# Patient Record
Sex: MALE | Race: WHITE | NOT HISPANIC OR LATINO | Employment: FULL TIME | URBAN - METROPOLITAN AREA
[De-identification: names, ages, dates, MRNs, and addresses within clinical notes are randomized per-mention and may not be internally consistent; named-entity substitution may affect disease eponyms.]

---

## 2019-04-18 ENCOUNTER — OFFICE VISIT (OUTPATIENT)
Dept: FAMILY MEDICINE | Facility: CLINIC | Age: 34
End: 2019-04-18
Payer: COMMERCIAL

## 2019-04-18 VITALS
HEIGHT: 69 IN | BODY MASS INDEX: 28.14 KG/M2 | WEIGHT: 190 LBS | DIASTOLIC BLOOD PRESSURE: 88 MMHG | OXYGEN SATURATION: 97 % | TEMPERATURE: 97.5 F | SYSTOLIC BLOOD PRESSURE: 128 MMHG | HEART RATE: 70 BPM

## 2019-04-18 DIAGNOSIS — F51.02 ADJUSTMENT INSOMNIA: ICD-10-CM

## 2019-04-18 DIAGNOSIS — L03.011 PARONYCHIA OF FINGER, RIGHT: Primary | ICD-10-CM

## 2019-04-18 PROCEDURE — 10060 I&D ABSCESS SIMPLE/SINGLE: CPT | Mod: F9 | Performed by: NURSE PRACTITIONER

## 2019-04-18 PROCEDURE — 99213 OFFICE O/P EST LOW 20 MIN: CPT | Mod: 25 | Performed by: NURSE PRACTITIONER

## 2019-04-18 RX ORDER — CEPHALEXIN 500 MG/1
500 CAPSULE ORAL 3 TIMES DAILY
Qty: 21 CAPSULE | Refills: 0 | Status: SHIPPED | OUTPATIENT
Start: 2019-04-18 | End: 2019-04-25

## 2019-04-18 RX ORDER — ZOLPIDEM TARTRATE 10 MG/1
5-10 TABLET ORAL
Qty: 30 TABLET | Refills: 0 | Status: SHIPPED | OUTPATIENT
Start: 2019-04-18 | End: 2021-03-16

## 2019-04-18 ASSESSMENT — MIFFLIN-ST. JEOR: SCORE: 1788.24

## 2019-04-18 NOTE — PATIENT INSTRUCTIONS
Soak finger in warm soapy water for 10 minutes at least 4 times a day. More if possible.   Use antibiotic ointment 2-3 times a day for no more than 1 week   If you notice more redness and swelling again start the antibiotic.   Call with any questions       Patient Education     Paronychia of the Finger or Toe  Paronychia is an infection near a fingernail or toenail. It usually occurs when an opening in the cuticle or an ingrown toenail lets bacteria under the skin.  The infection will need to be drained if pus is present. If the infection has been caught early, you may need only antibiotic treatment. Healing will take about 1 to 2 weeks.  Home care  Follow these guidelines when caring for yourself at home:    Clean and soak the toe or finger. Do this 2 times a day for the first 3 days. To do so:  ? Soak your foot or hand in a tub of warm water for 5 minutes. Or hold your toe or finger under a faucet of warm running water for 5 minutes.  ? Clean any crust away with soap and water using a cotton swab.  ? Put antibiotic ointment on the infected area.    Change the dressing daily or any time it gets dirty.    If you were given antibiotics, take them as directed until they are all gone.    If your infection is on a toe, wear comfortable shoes with a lot of toe room. You can also wear open-toed sandals while your toe heals.    You may use over-the-counter medicine (acetaminophen or ibuprofen to help with pain, unless another medicine was prescribed. If you have chronic liver or kidney disease, talk with your healthcare provider before using these medicines. Also talk with your provider if you've had a stomach ulcer or GI (gastrointestinal) bleeding.  Prevention  The following can prevent paronychia:    Avoid cutting or playing with your cuticles at home.    Don't bite your nails.    Don't suck on your thumbs or fingers.  Follow-up care  Follow up with your healthcare provider, or as advised.  When to seek medical  advice  Call your healthcare provider right away if any of these occur:    Redness, pain, or swelling of the finger or toe gets worse    Red streaks in the skin leading away from the wound    Pus or fluid draining from the nail area    Fever of 100.4 F (38 C) or higher, or as directed by your provider  Date Last Reviewed: 8/1/2016 2000-2018 [employer]. [address] [place] [address]. All rights reserved. This information is not intended as a substitute for professional medical care. Always follow your healthcare professional's instructions.

## 2019-04-18 NOTE — PROGRESS NOTES
"HPI    SUBJECTIVE:   Vazquez Rodriguez is a 34 year old male who presents to clinic today for the following   health issues:        Chief Complaint   Patient presents with     Finger     right 5th finger for 4 days     Right 5th finger swelling for about 4 days  No known injury   Started soaking last night which seems to help a little   No similar past     Also requesting refill of ambien. He travels a lot for work and takes it when he has international trips. Last script was 3 years ago so takes it infrequently.     Past Medical History:   Diagnosis Date     NO ACTIVE PROBLEMS      Family History   Problem Relation Age of Onset     Family History Negative Mother      Family History Negative Father      Family History Negative Paternal Grandfather      Past Surgical History:   Procedure Laterality Date     HC TOOTH EXTRACTION W/FORCEP  03/2004    [facility]is[facility] teeth     Social History     Tobacco Use     Smoking status: Never Smoker     Smokeless tobacco: Never Used   Substance Use Topics     Alcohol use: Yes     Comment: couple beers a month     Current Outpatient Medications   Medication Sig Dispense Refill     cephALEXin (KEFLEX) 500 MG capsule Take 1 capsule (500 mg) by mouth 3 times daily for 7 days 21 capsule 0     melatonin 3 MG tablet Take 1 tablet (3 mg) by mouth nightly as needed for sleep 20 tablet 1     zolpidem (AMBIEN) 10 MG tablet Take 0.5-1 tablets (5-10 mg) by mouth nightly as needed for sleep 30 tablet 0     Allergies   Allergen Reactions     No Known Drug Allergies        Reviewed and updated as needed this visit by clinical staff and provider     ROS  Detailed as above     /88 (BP Location: Right arm, Patient Position: Sitting, Cuff Size: Adult Regular)   Pulse 70   Temp 97.5  F (36.4  C) (Oral)   Ht 1.746 m (5' 8.75\")   Wt 86.2 kg (190 lb)   SpO2 97%   BMI 28.26 kg/m     Physical Exam   Constitutional: He appears well-developed.   Pulmonary/Chest: Effort normal.   Neurological: He is " alert.   Skin: Skin is warm and dry.   Swelling, erythema and large pustule adjacent to nail of R 5th finger   Psychiatric: He has a normal mood and affect. Judgment normal.       Assessment and Plan:       ICD-10-CM    1. Paronychia of finger, right L03.011 cephALEXin (KEFLEX) 500 MG capsule     DRAIN SKIN ABSCESS SIMPLE/SINGLE   2. Adjustment insomnia F51.02 zolpidem (AMBIEN) 10 MG tablet     Finger was cleansed with betadine. Single puncture was made with 20 gauge needle and large amt of purulent drainage was expelled.   Instructed on warm soaks and keeping area clean. Sent with antibiotic to start only if it worsens.. Call with concerns.   Given refill on ambien that he takes infrequently.       JOHANA Swift, CNP  [facility]

## 2020-02-28 ENCOUNTER — TRANSFERRED RECORDS (OUTPATIENT)
Dept: HEALTH INFORMATION MANAGEMENT | Facility: CLINIC | Age: 35
End: 2020-02-28

## 2020-02-28 LAB
CHOLEST SERPL-MCNC: 204 MG/DL (ref 125–199)
GLUCOSE SERPL-MCNC: 81 MG/DL (ref 70–99)
HDLC SERPL-MCNC: 38 MG/DL

## 2020-04-27 ENCOUNTER — VIRTUAL VISIT (OUTPATIENT)
Dept: FAMILY MEDICINE | Facility: CLINIC | Age: 35
End: 2020-04-27
Payer: COMMERCIAL

## 2020-04-27 VITALS — HEIGHT: 69 IN | WEIGHT: 185 LBS | BODY MASS INDEX: 27.4 KG/M2

## 2020-04-27 DIAGNOSIS — R30.0 DYSURIA: ICD-10-CM

## 2020-04-27 DIAGNOSIS — R30.0 DYSURIA: Primary | ICD-10-CM

## 2020-04-27 DIAGNOSIS — R31.29 MICROSCOPIC HEMATURIA: ICD-10-CM

## 2020-04-27 LAB
ALBUMIN UR-MCNC: NEGATIVE MG/DL
APPEARANCE UR: CLEAR
BACTERIA #/AREA URNS HPF: ABNORMAL /HPF
BILIRUB UR QL STRIP: NEGATIVE
COLOR UR AUTO: YELLOW
GLUCOSE UR STRIP-MCNC: NEGATIVE MG/DL
HGB UR QL STRIP: ABNORMAL
KETONES UR STRIP-MCNC: NEGATIVE MG/DL
LEUKOCYTE ESTERASE UR QL STRIP: NEGATIVE
NITRATE UR QL: NEGATIVE
NON-SQ EPI CELLS #/AREA URNS LPF: ABNORMAL /LPF
PH UR STRIP: 7 PH (ref 5–7)
RBC #/AREA URNS AUTO: ABNORMAL /HPF
SOURCE: ABNORMAL
SP GR UR STRIP: 1.02 (ref 1–1.03)
UROBILINOGEN UR STRIP-ACNC: 0.2 EU/DL (ref 0.2–1)
WBC #/AREA URNS AUTO: ABNORMAL /HPF

## 2020-04-27 PROCEDURE — 99213 OFFICE O/P EST LOW 20 MIN: CPT | Mod: TEL | Performed by: FAMILY MEDICINE

## 2020-04-27 PROCEDURE — 81001 URINALYSIS AUTO W/SCOPE: CPT | Performed by: FAMILY MEDICINE

## 2020-04-27 ASSESSMENT — MIFFLIN-ST. JEOR: SCORE: 1764.53

## 2020-04-27 NOTE — PROGRESS NOTES
"Vazquez Rodriguez is a 35 year old male who is being evaluated via a billable telephone visit.      The patient has been notified of following:     \"This telephone visit will be conducted via a call between you and your physician/provider. We have found that certain health care needs can be provided without the need for a physical exam.  This service lets us provide the care you need with a short phone conversation.  If a prescription is necessary we can send it directly to your pharmacy.  If lab work is needed we can place an order for that and you can then stop by our lab to have the test done at a later time.    Telephone visits are billed at different rates depending on your insurance coverage. During this emergency period, for some insurers they may be billed the same as an in-person visit.  Please reach out to your insurance provider with any questions.    If during the course of the call the physician/provider feels a telephone visit is not appropriate, you will not be charged for this service.\"    Patient has given verbal consent for Telephone visit?  Yes    How would you like to obtain your AVS? E-Mail (inform patient AVS not encrypted) [email or web address]    Subjective     Vazquez Rodriguez is a 35 year old male who presents to clinic today for the following health issues:    HPI  Genitourinary symptoms      Duration: 4-5 days intermittent    Description:  dysuria, frequency and dark color urine and discomfort during urination    Intensity:  mild    Accompanying signs and symptoms (fever/discharge/nausea/vomiting/back or abdominal pain):  None    History (frequent UTI's/kidney stones/prostate problems): None  Sexually active: YES    Precipitating or alleviating factors: None    Therapies tried and outcome: none   Outcome: n/a    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     3 weeks ago - minimal darker color, next day  - better.   When starts urinating - some discomfort for last few " "days.   Intermittently through out the day - some discomfort around groin region and sometime around penis.   Running 4-5 miles per day and not having symptoms during that time.   No history of bladder infection.   No history of kidney stone. No family history of kidney stone.   Last week - some stomach cramps.   Urine discoloration is not related to activity.   No back pain.   No concerns of stds.   No urgency. No urinary frequency.         Reviewed and updated as needed this visit by Provider         Review of Systems   ROS COMP: Constitutional, HEENT, cardiovascular, pulmonary, gi and gu systems are negative, except as otherwise noted.       Objective   Reported vitals:  Ht 1.753 m (5' 9\")   Wt 83.9 kg (185 lb)   BMI 27.32 kg/m     healthy, alert and no distress  PSYCH: Alert and oriented times 3; coherent speech, normal   rate and volume, able to articulate logical thoughts, able   to abstract reason, no tangential thoughts, no hallucinations   or delusions  His affect is normal  RESP: No cough, no audible wheezing, able to talk in full sentences  Remainder of exam unable to be completed due to telephone visits    Diagnostic Test Results:  Labs reviewed in Epic  Results for orders placed or performed in visit on 04/27/20   *UA reflex to Microscopic and Culture ([facility] (except [place] and [place])     Status: Abnormal    Specimen: Midstream Urine   Result Value Ref [facility]    Color Urine Yellow     Appearance Urine Clear     Glucose Urine Negative NEG^Negative mg/dL    Bilirubin Urine Negative NEG^Negative    Ketones Urine Negative NEG^Negative mg/dL    Specific Gravity Urine 1.020 1.003 - 1.035    Blood Urine Moderate (A) NEG^Negative    pH Urine 7.0 5.0 - 7.0 pH    Protein Albumin Urine Negative NEG^Negative mg/dL    Urobilinogen Urine 0.2 0.2 - 1.0 EU/dL    Nitrite Urine Negative NEG^Negative    Leukocyte Esterase Urine Negative NEG^Negative    Source Midstream Urine    Urine " Microscopic     Status: Abnormal   Result Value Ref [facility]    WBC Urine 0 - 5 OTO5^0 - 5 /HPF    RBC Urine 5-10 (A) OTO2^O - 2 /HPF    Squamous Epithelial /LPF Urine Few FEW^Few /LPF    Bacteria Urine Few (A) NEG^Negative /HPF             Assessment/Plan:  1. Dysuria  From his symptoms UTI, prostatitis, kidney stone versus other etiology cannot be ruled out completely.  He was able to leave a urine specimen which showed microscopic hematuria.  He denies any concern of STDs and as per his request GC chlamydia were not obtained.  We discussed kidney stone cannot be ruled out completely and CT scan was needed for further evaluation.  We discussed his urinalysis results were phoned again.  -His symptoms are not severe and we agreed to hold off on immediate intervention due to coronavirus pandemic but if he notices worsening of his symptoms or notices any red flag symptoms it would be reasonable for him to go for a CT scan.  I put an order in.    - *UA reflex to Microscopic and Culture ([facility] (except [place] and [place]); Future  - CT Abdomen Pelvis w/o & w Contrast; Future    2. Microscopic hematuria  See above.   - *UA reflex to Microscopic and Culture ([facility] (except [place] and [place]); Future  - CT Abdomen Pelvis w/o & w Contrast; Future      Phone call duration:  23 minutes    Ramírez Woods MD, MD

## 2020-11-29 ENCOUNTER — HEALTH MAINTENANCE LETTER (OUTPATIENT)
Age: 35
End: 2020-11-29

## 2020-11-30 ENCOUNTER — NURSE TRIAGE (OUTPATIENT)
Dept: NURSING | Facility: CLINIC | Age: 35
End: 2020-11-30

## 2020-11-30 NOTE — TELEPHONE ENCOUNTER
Caller  Is considering a Covid serology test; Options available  through [employer] discussed and  patient will opt for retail testing; directed to the [employer]  Web site for further instructions and patient understands   Iona Montenegro RN  FNA       Additional Information    General information question, no triage required and triager able to answer question    Protocols used: INFORMATION ONLY CALL-A-AH

## 2021-03-15 ASSESSMENT — ENCOUNTER SYMPTOMS
HEMATURIA: 0
DIARRHEA: 0
DIZZINESS: 0
PALPITATIONS: 0
CONSTIPATION: 0
COUGH: 0
ARTHRALGIAS: 0
NERVOUS/ANXIOUS: 0
MYALGIAS: 0
DYSURIA: 0
SORE THROAT: 0
FEVER: 0
PARESTHESIAS: 0
HEARTBURN: 0
SHORTNESS OF BREATH: 0
FREQUENCY: 0
EYE PAIN: 0
WEAKNESS: 0
CHILLS: 0
ABDOMINAL PAIN: 0
HEMATOCHEZIA: 0
JOINT SWELLING: 0
HEADACHES: 0
NAUSEA: 0

## 2021-03-16 ENCOUNTER — OFFICE VISIT (OUTPATIENT)
Dept: FAMILY MEDICINE | Facility: CLINIC | Age: 36
End: 2021-03-16
Payer: COMMERCIAL

## 2021-03-16 VITALS
OXYGEN SATURATION: 98 % | HEIGHT: 69 IN | HEART RATE: 70 BPM | BODY MASS INDEX: 27.85 KG/M2 | DIASTOLIC BLOOD PRESSURE: 90 MMHG | TEMPERATURE: 97.6 F | SYSTOLIC BLOOD PRESSURE: 153 MMHG | RESPIRATION RATE: 16 BRPM | WEIGHT: 188 LBS

## 2021-03-16 DIAGNOSIS — Z00.00 ROUTINE GENERAL MEDICAL EXAMINATION AT A HEALTH CARE FACILITY: Primary | ICD-10-CM

## 2021-03-16 DIAGNOSIS — R03.0 ELEVATED BP WITHOUT DIAGNOSIS OF HYPERTENSION: ICD-10-CM

## 2021-03-16 PROCEDURE — 99395 PREV VISIT EST AGE 18-39: CPT | Performed by: FAMILY MEDICINE

## 2021-03-16 ASSESSMENT — ENCOUNTER SYMPTOMS
SHORTNESS OF BREATH: 0
EYE PAIN: 0
PALPITATIONS: 0
COUGH: 0
CONSTIPATION: 0
HEMATOCHEZIA: 0
DIARRHEA: 0
DIZZINESS: 0
ABDOMINAL PAIN: 0
FEVER: 0
SORE THROAT: 0
NERVOUS/ANXIOUS: 0
MYALGIAS: 0
HEARTBURN: 0
ARTHRALGIAS: 0
NAUSEA: 0
PARESTHESIAS: 0
DYSURIA: 0
CHILLS: 0
HEMATURIA: 0
JOINT SWELLING: 0
HEADACHES: 0
FREQUENCY: 0
WEAKNESS: 0

## 2021-03-16 ASSESSMENT — MIFFLIN-ST. JEOR: SCORE: 1769.17

## 2021-03-16 NOTE — PROGRESS NOTES
SUBJECTIVE:   CC: Vazquez Rodriguez is an 36 year old male who presents for preventative health visit.       Patient has been advised of split billing requirements and indicates understanding: Yes  Healthy Habits:     Getting at least 3 servings of Calcium per day:  Yes    Bi-annual eye exam:  Yes    Dental care twice a year:  Yes    Sleep apnea or symptoms of sleep apnea:  None    Diet:  Carbohydrate counting    Frequency of exercise:  6-7 days/week    Duration of exercise:  45-60 minutes    Taking medications regularly:  Yes    Medication side effects:  None    PHQ-2 Total Score: 0    Additional concerns today:  No              Today's PHQ-2 Score:   PHQ-2 ( 1999 Pfizer) 3/15/2021   Q1: Little interest or pleasure in doing things 0   Q2: Feeling down, depressed or hopeless 0   PHQ-2 Score 0   Q1: Little interest or pleasure in doing things Not at all   Q2: Feeling down, depressed or hopeless Not at all   PHQ-2 Score 0       Abuse: Current or Past(Physical, Sexual or Emotional)- No  Do you feel safe in your environment? Yes    Have you ever done Advance Care Planning? (For example, a Health Directive, POLST, or a discussion with a medical provider or your loved ones about your wishes): No, advance care planning information given to patient to review.  Patient plans to discuss their wishes with loved ones or provider.      Social History     Tobacco Use     Smoking status: Never Smoker     Smokeless tobacco: Never Used   Substance Use Topics     Alcohol use: Yes     Comment: couple beers a month     If you drink alcohol do you typically have >3 drinks per day or >7 drinks per week? No    Alcohol Use 3/15/2021   Prescreen: >3 drinks/day or >7 drinks/week? No       Last PSA: No results found for: PSA    Reviewed orders with patient. Reviewed health maintenance and updated orders accordingly - Yes       Reviewed and updated as needed this visit by clinical staff    Reviewed and updated as needed this visit by  Provider     No symptoms since last visit. Would like to hold off on kidney ct scan.     No changes in health. Herniated disk - cortisone shot in back and seeing them at [facility].     Remote work.     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   Exercise - running 5-6 times per week around 4 miles at a time. 1-2 times per week yoga and some strength training. Was in [place] from Dec to Feb.     Going to get covid shot - volunteering at covid vaccine site.     Gets cholesterol and glucose through work yearly.     Review of Systems   Constitutional: Negative for chills and fever.   HENT: Negative for congestion, ear pain, hearing loss and sore throat.    Eyes: Negative for pain and visual disturbance.   Respiratory: Negative for cough and shortness of breath.    Cardiovascular: Negative for chest pain, palpitations and peripheral edema.   Gastrointestinal: Negative for abdominal pain, constipation, diarrhea, heartburn, hematochezia and nausea.   Genitourinary: Negative for discharge, dysuria, frequency, genital sores, hematuria, impotence and urgency.   Musculoskeletal: Negative for arthralgias, joint swelling and myalgias.   Skin: Negative for rash.   Neurological: Negative for dizziness, weakness, headaches and paresthesias.   Psychiatric/Behavioral: Negative for mood changes. The patient is not nervous/anxious.       OBJECTIVE:   BP (!) 151/93   Pulse 78   Temp 97.6  F (36.4  C) (Oral)   Resp 16   Wt 85.3 kg (188 lb)   SpO2 98%   BMI 27.76 kg/m      Physical Exam  GENERAL: healthy, alert and no distress  EYES: Eyes grossly normal to inspection, PERRL and conjunctivae and sclerae normal  HENT: ear canals and TM's normal,   NECK: no adenopathy, no asymmetry, masses, or scars and thyroid normal to palpation  RESP: lungs clear to auscultation - no rales, rhonchi or wheezes  CV: regular rate and rhythm, normal S1 S2, no S3 or S4, no murmur, click or rub, no peripheral edema and peripheral pulses  "strong  ABDOMEN: soft, nontender, no hepatosplenomegaly, no masses and bowel sounds normal   (male): normal male genitalia without lesions or urethral discharge, no hernia  MS: no gross musculoskeletal defects noted, no edema  SKIN: no suspicious lesions or rashes  NEURO: Normal strength and tone, mentation intact and speech normal  PSYCH: mentation appears normal, affect normal/bright    ASSESSMENT/PLAN:   1. Routine general medical examination at a health care facility  Doing well. Labs through work - will submit copy.    Elevated bp without diagnosis of HTN  1 month MA only for recheck. Check bp outside. If persistently high - follow up.      Patient has been advised of split billing requirements and indicates understanding: No  COUNSELING:   Reviewed preventive health counseling, as reflected in patient instructions  Special attention given to:        Regular exercise       Healthy diet/nutrition       Vision screening       Hearing screening    Estimated body mass index is 27.32 kg/m  as calculated from the following:    Height as of 4/27/20: 1.753 m (5' 9\").    Weight as of 4/27/20: 83.9 kg (185 lb).     Weight management plan: Discussed healthy diet and exercise guidelines    He reports that he has never smoked. He has never used smokeless tobacco.    Counseling Resources:  ATP IV Guidelines  Pooled Cohorts Equation Calculator  FRAX Risk Assessment  ICSI Preventive Guidelines  Dietary Guidelines for Americans, 2010  USDA's MyPlate  ASA Prophylaxis  Lung CA Screening    Ramírez Woods MD, MD  [facility]  "

## 2021-03-30 ENCOUNTER — OFFICE VISIT (OUTPATIENT)
Dept: DERMATOLOGY | Facility: CLINIC | Age: 36
End: 2021-03-30
Payer: COMMERCIAL

## 2021-03-30 DIAGNOSIS — D22.9 MULTIPLE NEVI: Primary | ICD-10-CM

## 2021-03-30 PROCEDURE — 99203 OFFICE O/P NEW LOW 30 MIN: CPT | Performed by: DERMATOLOGY

## 2021-03-30 ASSESSMENT — PAIN SCALES - GENERAL: PAINLEVEL: NO PAIN (0)

## 2021-03-30 NOTE — PROGRESS NOTES
[facility] Dermatology Note  Encounter Date: Mar 30, 2021  Office Visit     Dermatology Problem List:  1. Family history of non-melanoma skin cancer    ____________________________________________    Assessment & Plan:  # Multiple clinically benign nevi on the trunk and extremities.  - ABCDEs: Counseled ABCDEs of melanoma: Asymmetry, Border (irregularity), Color (not uniform, changes in color), Diameter (greater than 6 mm which is about the size of a pencil eraser), and Evolving (any changes in preexisting moles).  - Sun protection: Counseled SPF30+ sunscreen, UPF clothing, sun avoidance, tanning bed avoidance.    # Dermatofibroma, L upper posterior thigh  - Reassured of benign etiology    Procedures Performed:   None    Follow-up: 1-2 year(s) in-person, or earlier for new or changing lesions    Staff and Resident: Bozena/Brooks (PGY2)    Philly Guy MD MPH  PGY2 Medicine/Dermatology  Pager [phone]  I, Nori Seals MD, saw this patient with the resident and agree with the resident s findings and plan of care as documented in the resident s note.    ____________________________________________    CC: No chief complaint on file.      HPI:  Mr. Vazquez Rodriguez is a(n) 36 year old male who presents today as a new patient for skin check. He denies new or changing lesions.     - Personal history of melanoma: denies  - Family history of melanoma: denies  - Personal history of NMSC: denies  - Personal history of tanning bed use: denies  - History of blistering childhood sunburns: denies  - History of solid organ transplant: denies  - Immunosuppressive medications: denies  - Sunscreen use: yes  - History of changing moles/lesions: denies  - History of persistently tender, painful, bleeding, or non-healing lesions: denies    Patient is otherwise feeling well, without additional concerns.    ROS: As per HPI    Labs:  None reviewed.    Physical Exam:  Vitals: There were no vitals taken for  this visit.  SKIN: Full skin, which includes the head/face, both arms, chest, back, abdomen,both legs, genitalia and/or groin buttocks, digits and/or nails, was examined.  - Multiple regular brown pigmented macules and papules are identified on the trunk and extremities   - Scattered brown macules on sun exposed areas.  - On the R upper posterior thigh, there is a fleshy skin colored papule  - On the L upper posterior thigh, there is a firm papule with a positive dimple sign  - No other lesions of concern on areas examined.     Medications:  No current outpatient medications on file.     No current facility-administered medications for this visit.       Past Medical/Surgical History:   Patient Active Problem List   Diagnosis     CARDIOVASCULAR SCREENING; LDL GOAL LESS THAN 160     Past Medical History:   Diagnosis Date     NO ACTIVE PROBLEMS        CC Referred Self, MD  No address on file on close of this encounter.

## 2021-03-30 NOTE — PATIENT INSTRUCTIONS
"Sun Protection    Sunscreen   What does \"broad spectrum mean\"?  Broad spectrum sunscreens protect against both UVA and UVB radiation. UVC is filtered out by the ozone layer.     What does SPF mean?   SPF stands for  Sun Protection Factor  and represents the ability to screen only UVB (burning) rays. UVB rays are mostly blocked in all sunscreens, but only those that contain titanium dioxide, zinc oxide, mexoryl or Parsol 1789 (avobenzone) block the UVA spectrum. Even though a sunscreen is labeled  UVA/UVB Protection  that is not entirely accurate because products that only partially protect against UVA can claim to protect against both UVA and UVB.     What SPF should I chose?   Aim to get a sunscreen that is at least sun protection factor (SPF) 30. SPF 15 provides about 92-93% coverage, SPF 30 about 95-97% coverage, and SPF 45 about 98% coverage. That is to say, SPF 30 is not twice as good as SPF 15. The reason why we recommend SPF 30 is because we are usually only putting on half the necessary amount of sunscreen to achieve the advertised protection. That means that it is very possible that your SPF 30 sunscreen is only providing you with SPF 15 coverage based on how much you are applying. SPF 15 (92-93% coverage) is the absolute minimal that we recommend. Similarly, the benefit of sunscreens with SPF higher than 50 is that even if you put on less than the required amount, you are likely still getting good protection (ex: even if you apply only half the recommended amount of , it should still provide you with an SPF of 50).     How much should I apply?  If covering your whole body, you should be using 30 grams, or one ounce, which is how much is in one shot glass! That s a THICK layer! May times, you are only applying half the recommended amount, which means that you are only getting half the SPF (for example, you may be using SPF 30 but if you're only applying half the recommended amount, you're only " getting SPF 15 protection.      When do I need to wear sunscreen?  Every day, rain or shine! Even on a rainy day or a day when you are only indoors, you are still being exposed harmful UV radiation from the sun. We usually recommend physical/mineral sunscreens (active ingredient is titanium dioxide or zinc oxide) as these ingredients have been around for many years, there is no concern of them being absorbed into the bloodstream, and they are coral reef friendly! However, the best sunscreen is the one that you will use everyday.     What about my kids?  Sunscreen is not recommended for infants under the age of 6 months. Use clothing, shade and sun avoidance for small infants. For kids older than 6 months, we recommend that you should use only mineral/physical sunscreens that have zinc oxide as the active ingredient. Sun-protective clothing and hats are also important for people of all ages.     Sun protective Clothing:  www.coolibar.com  www.sunprecautions.com  www.[place].Dial2Do    Do I need tinted sunscreen?  There is more and more research showing that visible light can also lead to discoloration (such as melasma). Tinted sunscreens (which contain iron oxides) protect against visible light as an added bonus.     What brands do you recommend?    Physical/Mineral Sunscreens (in no particular order)  Elta MD UV Physical Broad-Spectrum SPF 41 Sunscreen (Tinted, $33)  Skin Ceuticals Physical Fusion UV Defense SPF 50 (Tinted, $34)  Unsun Mineral Tinted Face Sunscreen (Tinted, with 2 shade ranges, $29)  It Cosmetics CC+ Cream with SPF 50+ (Tinted, can also double as foundation/coverage -- great range of shades, $40)  Biossance Squalane + Zinc Sheer Mineral Sunscreen SPF 30 PA +++ (goes on white then blends in, $30)  Cerave 100% Mineral Sunscreen SPF 50 Face (good for sensitive skin, $15)  La Roche Posay Anthelios Mineral Zinc Oxide Sunscreen SPF 50 ($35)  La Roche Posay Anthelios Mineral Tinted Sunscreen for Face SPF 50  "($35)  Think Sport Sunscreen (great for sports, though has more of a white cast, $20)  Think Baby Sunscreen (for kids, $21)  Color Science Sunforgettable Total Protection Brush On Shield SPF 50 (Multiple tints, $130)    Chemical Sunscreens  Lavernraul Brooksau Weightless Protection SPF 30 ($48)  Ernst SPF Brightening Moisturizer ($30)  Urban Skin Complexion Protection Moisturizer SPF 30 ($20)  Total Defense + Repair Broad Spectrum SPF 34 ($68)  Clarins UV PLUS Anti-Pollution Sunscreen Multi-Protection Tint SPF 50 (Multiple tints, $45)  Neutrogena Healthy Skin Glow Sheers Tinted Moisturizer with SPF 20 (Multiple tints, $11)    The ABCDEs of Melanoma  Skin cancer can develop anywhere on the skin. Once a month, take a look at your entire body and note any changing moles or spots. Ask someone for help when checking your skin, especially for hard to see places such as your back. If you notice a mole that looks different from others, or one that changes, enlarges, itches, or bleeds, you should see a dermatologist.    Asymmetry, Border (irregularity), Color (not uniform, changes in color), Diameter (greater than 6 mm which is about the size of a pencil eraser), and Evolving (any changes in pre-existing moles). In short, look for the \"ugly duckling.\" You want all of the spots on your body to look like cousins (like they could be related). If something stands out, take a photo of it and make an appointment to have it evaluated.       "

## 2021-03-30 NOTE — LETTER
3/30/2021       RE: Vazquez Rodriguez  [address]  [place] [address]     Dear Colleague,    Thank you for referring your patient, Vazquez Rodriguez, to the [facility] [place] at [facility]. Please see a copy of my visit note below.    [facility] Dermatology Note  Encounter Date: Mar 30, 2021  Office Visit     Dermatology Problem List:  1. Family history of non-melanoma skin cancer    ____________________________________________    Assessment & Plan:  # Multiple clinically benign nevi on the trunk and extremities.  - ABCDEs: Counseled ABCDEs of melanoma: Asymmetry, Border (irregularity), Color (not uniform, changes in color), Diameter (greater than 6 mm which is about the size of a pencil eraser), and Evolving (any changes in preexisting moles).  - Sun protection: Counseled SPF30+ sunscreen, UPF clothing, sun avoidance, tanning bed avoidance.    # Dermatofibroma, L upper posterior thigh  - Reassured of benign etiology    Procedures Performed:   None    Follow-up: 1-2 year(s) in-person, or earlier for new or changing lesions    Staff and Resident: Bozena/Brooks (PGY2)    Philly Guy MD MPH  PGY2 Medicine/Dermatology  Pager [phone]  I, Nori Seals MD, saw this patient with the resident and agree with the resident s findings and plan of care as documented in the resident s note.    ____________________________________________    CC: No chief complaint on file.      HPI:  Mr. Vazquez Rodriguez is a(n) 36 year old male who presents today as a new patient for skin check. He denies new or changing lesions.     - Personal history of melanoma: denies  - Family history of melanoma: denies  - Personal history of NMSC: denies  - Personal history of tanning bed use: denies  - History of blistering childhood sunburns: denies  - History of solid organ transplant: denies  - Immunosuppressive medications:  denies  - Sunscreen use: yes  - History of changing moles/lesions: denies  - History of persistently tender, painful, bleeding, or non-healing lesions: denies    Patient is otherwise feeling well, without additional concerns.    ROS: As per HPI    Labs:  None reviewed.    Physical Exam:  Vitals: There were no vitals taken for this visit.  SKIN: Full skin, which includes the head/face, both arms, chest, back, abdomen,both legs, genitalia and/or groin buttocks, digits and/or nails, was examined.  - Multiple regular brown pigmented macules and papules are identified on the trunk and extremities   - Scattered brown macules on sun exposed areas.  - On the R upper posterior thigh, there is a fleshy skin colored papule  - On the L upper posterior thigh, there is a firm papule with a positive dimple sign  - No other lesions of concern on areas examined.     Medications:  No current outpatient medications on file.     No current facility-administered medications for this visit.       Past Medical/Surgical History:   Patient Active Problem List   Diagnosis     CARDIOVASCULAR SCREENING; LDL GOAL LESS THAN 160     Past Medical History:   Diagnosis Date     NO ACTIVE PROBLEMS        CC Referred Self, MD  No address on file on close of this encounter.

## 2021-04-22 ENCOUNTER — ALLIED HEALTH/NURSE VISIT (OUTPATIENT)
Dept: NURSING | Facility: CLINIC | Age: 36
End: 2021-04-22
Payer: COMMERCIAL

## 2021-04-22 VITALS — HEART RATE: 88 BPM | DIASTOLIC BLOOD PRESSURE: 86 MMHG | OXYGEN SATURATION: 98 % | SYSTOLIC BLOOD PRESSURE: 135 MMHG

## 2021-04-22 DIAGNOSIS — Z01.30 BP CHECK: Primary | ICD-10-CM

## 2021-04-22 PROCEDURE — 99207 PR NO CHARGE NURSE ONLY: CPT

## 2021-04-22 NOTE — PROGRESS NOTES
Vazquez Rodriguez is a 36 year old patient who comes in today for a Blood Pressure check.  Initial BP:  /86 (BP Location: Right arm, Patient Position: Sitting, Cuff Size: Adult Regular)   Pulse 88   SpO2 98%      88  Disposition: follow-up as previously indicated by provider    Capri Nuñez CMA on 4/22/2021 at 9:08 AM

## 2021-09-19 ENCOUNTER — HEALTH MAINTENANCE LETTER (OUTPATIENT)
Age: 36
End: 2021-09-19

## 2022-01-12 ENCOUNTER — OFFICE VISIT (OUTPATIENT)
Dept: FAMILY MEDICINE | Facility: CLINIC | Age: 37
End: 2022-01-12
Payer: COMMERCIAL

## 2022-01-12 VITALS
SYSTOLIC BLOOD PRESSURE: 152 MMHG | WEIGHT: 189.25 LBS | BODY MASS INDEX: 28.15 KG/M2 | TEMPERATURE: 98.9 F | DIASTOLIC BLOOD PRESSURE: 80 MMHG | HEART RATE: 87 BPM | OXYGEN SATURATION: 99 %

## 2022-01-12 DIAGNOSIS — N52.9 ERECTILE DYSFUNCTION, UNSPECIFIED ERECTILE DYSFUNCTION TYPE: Primary | ICD-10-CM

## 2022-01-12 PROCEDURE — 99213 OFFICE O/P EST LOW 20 MIN: CPT | Performed by: PHYSICIAN ASSISTANT

## 2022-01-12 PROCEDURE — 36415 COLL VENOUS BLD VENIPUNCTURE: CPT | Performed by: PHYSICIAN ASSISTANT

## 2022-01-12 PROCEDURE — 84403 ASSAY OF TOTAL TESTOSTERONE: CPT | Performed by: PHYSICIAN ASSISTANT

## 2022-01-12 NOTE — PROGRESS NOTES
Assessment & Plan     Erectile dysfunction, unspecified erectile dysfunction type    Check testosterone level. Suspect that symptoms are related to stress or relationship change. If not  Improving in 1-2 weeks, he can send a message and we can trial ED medications.    - Testosterone, total; Future  - Testosterone, total                   No follow-ups on file.    Aiden Colbert PA-C  [facility] LUKE Carrillo is a 36 year old who presents for the following health issues     HPI     Concern - erectile dysfunction   Onset: a couple weeks   Description: pt is having difficulty getting an erection- happens both with sexual activity and masturbation    Recently switched relationships after a bad break-up. His job is stressful but no additional stress than normal.       Review of Systems   Constitutional, HEENT, cardiovascular, pulmonary, gi and gu systems are negative, except as otherwise noted.        Objective    BP (!) 152/80 (BP Location: Left arm, Patient Position: Chair, Cuff Size: Adult Regular)   Pulse 87   Temp 98.9  F (37.2  C) (Oral)   Wt 85.8 kg (189 lb 4 oz)   SpO2 99%   BMI 28.15 kg/m    Body mass index is 28.15 kg/m .       Physical Exam     GENERAL: healthy, alert and no distress  EYES: Eyes grossly normal to inspection, PERRL and conjunctivae and sclerae normal  MS: no gross musculoskeletal defects noted, no edema  SKIN: no suspicious lesions or rashes  NEURO: Normal strength and tone, mentation intact and speech normal  PSYCH: mentation appears normal, affect normal/bright

## 2022-01-14 LAB — TESTOST SERPL-MCNC: 618 NG/DL (ref 240–950)

## 2022-01-31 ENCOUNTER — TELEPHONE (OUTPATIENT)
Dept: FAMILY MEDICINE | Facility: CLINIC | Age: 37
End: 2022-01-31
Payer: COMMERCIAL

## 2022-01-31 NOTE — TELEPHONE ENCOUNTER
"Patient called to update provider. Patient states that since his visit on 1/12/2022, his erectile dysfunction has \"gotten a bit better, but not really.\" Advised patient to schedule follow up appointment with provider to further discuss options, patient agreed. Patient scheduled for virtual visit with provider tomorrow 2/1/2022.    MICHELLE ConteN, RN  [facility]          "

## 2022-04-19 ENCOUNTER — OFFICE VISIT (OUTPATIENT)
Dept: FAMILY MEDICINE | Facility: CLINIC | Age: 37
End: 2022-04-19
Payer: COMMERCIAL

## 2022-04-19 VITALS
SYSTOLIC BLOOD PRESSURE: 128 MMHG | HEIGHT: 69 IN | BODY MASS INDEX: 27.34 KG/M2 | RESPIRATION RATE: 20 BRPM | TEMPERATURE: 97.9 F | DIASTOLIC BLOOD PRESSURE: 70 MMHG | OXYGEN SATURATION: 100 % | HEART RATE: 74 BPM | WEIGHT: 184.6 LBS

## 2022-04-19 DIAGNOSIS — Z00.00 ROUTINE GENERAL MEDICAL EXAMINATION AT A HEALTH CARE FACILITY: Primary | ICD-10-CM

## 2022-04-19 DIAGNOSIS — Z13.1 SCREENING FOR DIABETES MELLITUS: ICD-10-CM

## 2022-04-19 DIAGNOSIS — Z13.220 SCREENING FOR HYPERLIPIDEMIA: ICD-10-CM

## 2022-04-19 DIAGNOSIS — Z11.4 SCREENING FOR HIV (HUMAN IMMUNODEFICIENCY VIRUS): ICD-10-CM

## 2022-04-19 DIAGNOSIS — G47.25 CIRCADIAN RHYTHM SLEEP DISORDER, JET LAG TYPE: ICD-10-CM

## 2022-04-19 DIAGNOSIS — Z11.59 NEED FOR HEPATITIS C SCREENING TEST: ICD-10-CM

## 2022-04-19 LAB — HOLD SPECIMEN: NORMAL

## 2022-04-19 PROCEDURE — 99213 OFFICE O/P EST LOW 20 MIN: CPT | Mod: 25 | Performed by: FAMILY MEDICINE

## 2022-04-19 PROCEDURE — 82947 ASSAY GLUCOSE BLOOD QUANT: CPT | Performed by: FAMILY MEDICINE

## 2022-04-19 PROCEDURE — 86803 HEPATITIS C AB TEST: CPT | Performed by: FAMILY MEDICINE

## 2022-04-19 PROCEDURE — 36415 COLL VENOUS BLD VENIPUNCTURE: CPT | Performed by: FAMILY MEDICINE

## 2022-04-19 PROCEDURE — 87389 HIV-1 AG W/HIV-1&-2 AB AG IA: CPT | Performed by: FAMILY MEDICINE

## 2022-04-19 PROCEDURE — 90715 TDAP VACCINE 7 YRS/> IM: CPT | Performed by: FAMILY MEDICINE

## 2022-04-19 PROCEDURE — 99395 PREV VISIT EST AGE 18-39: CPT | Mod: 25 | Performed by: FAMILY MEDICINE

## 2022-04-19 PROCEDURE — 80061 LIPID PANEL: CPT | Performed by: FAMILY MEDICINE

## 2022-04-19 PROCEDURE — 90471 IMMUNIZATION ADMIN: CPT | Performed by: FAMILY MEDICINE

## 2022-04-19 RX ORDER — ZOLPIDEM TARTRATE 10 MG/1
5-10 TABLET ORAL
Qty: 20 TABLET | Refills: 0 | Status: SHIPPED | OUTPATIENT
Start: 2022-04-19 | End: 2023-04-18

## 2022-04-19 ASSESSMENT — ENCOUNTER SYMPTOMS
WEAKNESS: 0
HEMATURIA: 0
DYSURIA: 0
NERVOUS/ANXIOUS: 0
CHILLS: 0
EYE PAIN: 0
FEVER: 0
HEMATOCHEZIA: 0
HEARTBURN: 0
COUGH: 0
DIZZINESS: 0
JOINT SWELLING: 0
ABDOMINAL PAIN: 0
MYALGIAS: 0
SORE THROAT: 0
DIARRHEA: 0
CONSTIPATION: 0
PALPITATIONS: 0
ARTHRALGIAS: 0
FREQUENCY: 0
PARESTHESIAS: 0
NAUSEA: 0
SHORTNESS OF BREATH: 0
HEADACHES: 0

## 2022-04-19 NOTE — PROGRESS NOTES
SUBJECTIVE:   CC: Vazquez Rodriguez is an 37 year old male who presents for preventative health visit.       Patient has been advised of split billing requirements and indicates understanding: Yes  Healthy Habits:     Getting at least 3 servings of Calcium per day:  Yes    Bi-annual eye exam:  Yes    Dental care twice a year:  Yes    Sleep apnea or symptoms of sleep apnea:  None    Diet:  Gluten-free/reduced    Frequency of exercise:  6-7 days/week    Duration of exercise:  30-45 minutes    Taking medications regularly:  Yes    Medication side effects:  Not applicable and None    PHQ-2 Total Score: 0    Additional concerns today:  Yes    Today's PHQ-2 Score:   PHQ-2 ( 1999 Pfizer) 4/19/2022   Q1: Little interest or pleasure in doing things 0   Q2: Feeling down, depressed or hopeless 0   PHQ-2 Score 0   PHQ-2 Total Score (12-17 Years)- Positive if 3 or more points; Administer PHQ-A if positive -   Q1: Little interest or pleasure in doing things Not at all   Q2: Feeling down, depressed or hopeless Not at all   PHQ-2 Score 0     Abuse: Current or Past(Physical, Sexual or Emotional)- No  Do you feel safe in your environment? Yes    Social History     Tobacco Use     Smoking status: Never Smoker     Smokeless tobacco: Never Used   Substance Use Topics     Alcohol use: Yes     Comment: couple beers a month     If you drink alcohol do you typically have >3 drinks per day or >7 drinks per week? No    Alcohol Use 4/19/2022   Prescreen: >3 drinks/day or >7 drinks/week? No   Prescreen: >3 drinks/day or >7 drinks/week? -   No flowsheet data found.    Last PSA: No results found for: PSA    Reviewed orders with patient. Reviewed health maintenance and updated orders accordingly - Yes       Reviewed and updated as needed this visit by clinical staff    Reviewed and updated as needed this visit by Provider    Back - had 2 injections and doing well since then.     Had ED couple of months ago - took few sildenafil and it was helpful  "and now not needing to use it but would like to keep his rx on list.     Travel a lot for work. took ambien in the past.   Going to travel soon[place]   No flying anxiety.      Review of Systems   Constitutional: Negative for chills and fever.   HENT: Negative for congestion, ear pain, hearing loss and sore throat.    Eyes: Negative for pain and visual disturbance.   Respiratory: Negative for cough and shortness of breath.    Cardiovascular: Negative for chest pain, palpitations and peripheral edema.   Gastrointestinal: Negative for abdominal pain, constipation, diarrhea, heartburn, hematochezia and nausea.   Genitourinary: Negative for dysuria, frequency, genital sores, hematuria and urgency.   Musculoskeletal: Negative for arthralgias, joint swelling and myalgias.   Skin: Negative for rash.   Neurological: Negative for dizziness, weakness, headaches and paresthesias.   Psychiatric/Behavioral: Negative for mood changes. The patient is not nervous/anxious.      OBJECTIVE:   /70 (BP Location: Right arm, Patient Position: Sitting, Cuff Size: Adult Regular)   Pulse 74   Temp 97.9  F (36.6  C) (Temporal)   Resp 20   Ht 1.75 m (5' 8.9\")   Wt 83.7 kg (184 lb 9.6 oz)   SpO2 100%   BMI 27.34 kg/m      Physical Exam  GENERAL: healthy, alert and no distress  EYES: Eyes grossly normal to inspection, PERRL and conjunctivae and sclerae normal  HENT: ear canals and TM's normal, nose and mouth without ulcers or lesions  NECK: no adenopathy, no asymmetry, masses, or scars and thyroid normal to palpation  RESP: lungs clear to auscultation - no rales, rhonchi or wheezes  CV: regular rate and rhythm, normal S1 S2, no S3 or S4, no murmur, click or rub, no peripheral edema and peripheral pulses strong  ABDOMEN: soft, nontender, no hepatosplenomegaly, no masses and bowel sounds normal   (male): normal male genitalia without lesions or urethral discharge, no hernia  MS: no gross " "musculoskeletal defects noted, no edema  SKIN: no suspicious lesions or rashes  NEURO: Normal strength and tone, mentation intact and speech normal  PSYCH: mentation appears normal, affect normal/bright        ASSESSMENT/PLAN:   (Z00.00) Routine general medical examination at a health care facility  (primary encounter diagnosis)  Comment:    Plan:      (Z11.4) Screening for HIV (human immunodeficiency virus)  Comment:    Plan: HIV Antigen Antibody Combo             (Z11.59) Need for hepatitis C screening test  Comment:    Plan: Hepatitis C Screen Reflex to HCV RNA Quant and         Genotype             (Z13.220) Screening for hyperlipidemia  Comment:    Plan: Lipid panel reflex to direct LDL Non-fasting             (G47.25) Circadian rhythm sleep disorder, jet lag type  Comment: Clinic refill policy discussed.  No refill without some sort of visit.  He understood.  PDMP checked.  No suspicious activity.  Previous record discussed.  We discussed controlled substance and side effects with Ambien.  He understood.  Try half a pill if possible to start with.  Plan: zolpidem (AMBIEN) 10 MG tablet     (Z13.1) Screening for diabetes mellitus  Comment:    Plan: Glucose                COUNSELING:   Reviewed preventive health counseling, as reflected in patient instructions  Special attention given to:        Regular exercise       Healthy diet/nutrition       Vision screening       Hearing screening    Estimated body mass index is 25.83 kg/m  as calculated from the following:    Height as of 2/1/22: 1.778 m (5' 10\").    Weight as of 2/1/22: 81.6 kg (180 lb).     Weight management plan: Discussed healthy diet and exercise guidelines     He reports that he has never smoked. He has never used smokeless tobacco.      Counseling Resources:  ATP IV Guidelines  Pooled Cohorts Equation Calculator  FRAX Risk Assessment  ICSI Preventive Guidelines  Dietary Guidelines for Americans, 2010  USDA's MyPlate  ASA Prophylaxis  Lung CA " Screening    Ramírez Woods MD, MD  [facility]

## 2022-04-20 LAB
CHOLEST SERPL-MCNC: 215 MG/DL
FASTING STATUS PATIENT QL REPORTED: NO
FASTING STATUS PATIENT QL REPORTED: NO
GLUCOSE BLD-MCNC: 99 MG/DL (ref 70–99)
HCV AB SERPL QL IA: NONREACTIVE
HDLC SERPL-MCNC: 41 MG/DL
HIV 1+2 AB+HIV1 P24 AG SERPL QL IA: NONREACTIVE
LDLC SERPL CALC-MCNC: 145 MG/DL
NONHDLC SERPL-MCNC: 174 MG/DL
TRIGL SERPL-MCNC: 143 MG/DL

## 2022-11-21 ENCOUNTER — HEALTH MAINTENANCE LETTER (OUTPATIENT)
Age: 37
End: 2022-11-21

## 2023-04-11 ASSESSMENT — ENCOUNTER SYMPTOMS
DIZZINESS: 0
WEAKNESS: 0
MYALGIAS: 0
HEMATOCHEZIA: 0
DYSURIA: 0
HEADACHES: 0
ARTHRALGIAS: 0
DIARRHEA: 0
CHILLS: 0
PARESTHESIAS: 0
COUGH: 0
SORE THROAT: 0
FEVER: 0
NERVOUS/ANXIOUS: 0
ABDOMINAL PAIN: 0
FREQUENCY: 0
SHORTNESS OF BREATH: 0
NAUSEA: 0
HEARTBURN: 0
PALPITATIONS: 0
EYE PAIN: 0
CONSTIPATION: 0
JOINT SWELLING: 0
HEMATURIA: 0

## 2023-04-18 ENCOUNTER — OFFICE VISIT (OUTPATIENT)
Dept: FAMILY MEDICINE | Facility: CLINIC | Age: 38
End: 2023-04-18
Payer: COMMERCIAL

## 2023-04-18 VITALS
HEIGHT: 69 IN | WEIGHT: 184.8 LBS | RESPIRATION RATE: 17 BRPM | DIASTOLIC BLOOD PRESSURE: 80 MMHG | OXYGEN SATURATION: 100 % | SYSTOLIC BLOOD PRESSURE: 124 MMHG | TEMPERATURE: 97.7 F | HEART RATE: 77 BPM | BODY MASS INDEX: 27.37 KG/M2

## 2023-04-18 DIAGNOSIS — Z13.220 SCREENING FOR HYPERLIPIDEMIA: ICD-10-CM

## 2023-04-18 DIAGNOSIS — Z13.1 SCREENING FOR DIABETES MELLITUS: ICD-10-CM

## 2023-04-18 DIAGNOSIS — G47.25 CIRCADIAN RHYTHM SLEEP DISORDER, JET LAG TYPE: ICD-10-CM

## 2023-04-18 DIAGNOSIS — Z00.00 ROUTINE GENERAL MEDICAL EXAMINATION AT A HEALTH CARE FACILITY: Primary | ICD-10-CM

## 2023-04-18 LAB
CHOLEST SERPL-MCNC: 219 MG/DL
FASTING STATUS PATIENT QL REPORTED: NO
GLUCOSE SERPL-MCNC: 98 MG/DL (ref 70–99)
HDLC SERPL-MCNC: 38 MG/DL
LDLC SERPL CALC-MCNC: 152 MG/DL
NONHDLC SERPL-MCNC: 181 MG/DL
TRIGL SERPL-MCNC: 146 MG/DL

## 2023-04-18 PROCEDURE — 99213 OFFICE O/P EST LOW 20 MIN: CPT | Mod: 25 | Performed by: FAMILY MEDICINE

## 2023-04-18 PROCEDURE — 99395 PREV VISIT EST AGE 18-39: CPT | Mod: 25 | Performed by: FAMILY MEDICINE

## 2023-04-18 PROCEDURE — 82947 ASSAY GLUCOSE BLOOD QUANT: CPT | Performed by: FAMILY MEDICINE

## 2023-04-18 PROCEDURE — 80061 LIPID PANEL: CPT | Performed by: FAMILY MEDICINE

## 2023-04-18 PROCEDURE — 36415 COLL VENOUS BLD VENIPUNCTURE: CPT | Performed by: FAMILY MEDICINE

## 2023-04-18 RX ORDER — ZOLPIDEM TARTRATE 10 MG/1
5-10 TABLET ORAL
Qty: 20 TABLET | Refills: 0 | Status: SHIPPED | OUTPATIENT
Start: 2023-04-18 | End: 2024-03-27

## 2023-04-18 ASSESSMENT — ENCOUNTER SYMPTOMS
CONSTIPATION: 0
HEADACHES: 0
DYSURIA: 0
HEMATOCHEZIA: 0
NERVOUS/ANXIOUS: 0
HEARTBURN: 0
PARESTHESIAS: 0
SORE THROAT: 0
ARTHRALGIAS: 0
WEAKNESS: 0
JOINT SWELLING: 0
CHILLS: 0
HEMATURIA: 0
DIARRHEA: 0
DIZZINESS: 0
ABDOMINAL PAIN: 0
NAUSEA: 0
SHORTNESS OF BREATH: 0
COUGH: 0
EYE PAIN: 0
FEVER: 0
FREQUENCY: 0
MYALGIAS: 0
PALPITATIONS: 0

## 2023-04-18 ASSESSMENT — PAIN SCALES - GENERAL: PAINLEVEL: NO PAIN (0)

## 2023-04-18 NOTE — PROGRESS NOTES
SUBJECTIVE:   CC: Gerardo is an 38 year old who presents for preventative health visit.       4/18/2023     8:30 AM   Additional Questions   Roomed by Leena Becker   Patient has been advised of split billing requirements and indicates understanding: Yes  Healthy Habits:     Getting at least 3 servings of Calcium per day:  Yes    Bi-annual eye exam:  Yes    Dental care twice a year:  Yes    Sleep apnea or symptoms of sleep apnea:  None    Diet:  Gluten-free/reduced    Frequency of exercise:  6-7 days/week    Duration of exercise:  45-60 minutes    Taking medications regularly:  Yes    Medication side effects:  Not applicable    PHQ-2 Total Score: 0    Additional concerns today:  No    Today's PHQ-2 Score:       4/11/2023    10:14 AM   PHQ-2 ( 1999 Pfizer)   Q1: Little interest or pleasure in doing things 0   Q2: Feeling down, depressed or hopeless 0   PHQ-2 Score 0   Q1: Little interest or pleasure in doing things Not at all    Not at all   Q2: Feeling down, depressed or hopeless Not at all    Not at all   PHQ-2 Score 0    0       Social History     Tobacco Use     Smoking status: Never     Smokeless tobacco: Never   Vaping Use     Vaping status: Not on file   Substance Use Topics     Alcohol use: Yes     Comment: couple beers a month             4/11/2023    10:14 AM   Alcohol Use   Prescreen: >3 drinks/day or >7 drinks/week? Yes   AUDIT SCORE  5         4/11/2023    10:14 AM   AUDIT - Alcohol Use Disorders Identification Test - Reproduced from the World Health Organization Audit 2001 (Second Edition)   1.  How often do you have a drink containing alcohol? 2 to 3 times a week   2.  How many drinks containing alcohol do you have on a typical day when you are drinking? 1 or 2   3.  How often do you have five or more drinks on one occasion? Monthly   4.  How often during the last year have you found that you were not able to stop drinking once you had started? Never   5.  How often during the last year have you failed to do  what was normally expected of you because of drinking? Never   6.  How often during the last year have you needed a first drink in the morning to get yourself going after a heavy drinking session? Never   7.  How often during the last year have you had a feeling of guilt or remorse after drinking? Never   8.  How often during the last year have you been unable to remember what happened the night before because of your drinking? Never   9.  Have you or someone else been injured because of your drinking? No   10. Has a relative, friend, doctor or other health care worker been concerned about your drinking or suggested you cut down? No   TOTAL SCORE 5       Last PSA: No results found for: PSA    Reviewed orders with patient. Reviewed health maintenance and updated orders accordingly - Yes     Reviewed and updated as needed this visit by clinical staff    Allergies  Meds            Reviewed and updated as needed this visit by Provider    In winter - some pain on side of throat. Had ENT appt and throat was checked and it was fine.     Work - travelling. Uses ambien when needs to use it for new time zone. Still has rx from old rx.     No longer needing viagra.     Review of Systems   Constitutional: Negative for chills and fever.   HENT: Negative for congestion, ear pain, hearing loss and sore throat.    Eyes: Negative for pain and visual disturbance.   Respiratory: Negative for cough and shortness of breath.    Cardiovascular: Negative for chest pain, palpitations and peripheral edema.   Gastrointestinal: Negative for abdominal pain, constipation, diarrhea, heartburn, hematochezia and nausea.   Genitourinary: Negative for dysuria, frequency, genital sores, hematuria, impotence, penile discharge and urgency.   Musculoskeletal: Negative for arthralgias, joint swelling and myalgias.   Skin: Negative for rash.   Neurological: Negative for dizziness, weakness, headaches and paresthesias.   Psychiatric/Behavioral: Negative  "for mood changes. The patient is not nervous/anxious.      OBJECTIVE:   /80 (BP Location: Right arm, Patient Position: Sitting, Cuff Size: Adult Regular)   Pulse 77   Temp 97.7  F (36.5  C) (Temporal)   Resp 17   Ht 1.75 m (5' 8.9\")   Wt 83.8 kg (184 lb 12.8 oz)   SpO2 100%   BMI 27.37 kg/m      Physical Exam  GENERAL: healthy, alert and no distress  EYES: Eyes grossly normal to inspection, PERRL and conjunctivae and sclerae normal  HENT: ear canals and TM's normal, nose and mouth without ulcers or lesions  NECK: no adenopathy, no asymmetry, masses, or scars and thyroid normal to palpation  RESP: lungs clear to auscultation - no rales, rhonchi or wheezes  CV: regular rate and rhythm, normal S1 S2, no S3 or S4, no murmur, click or rub, no peripheral edema and peripheral pulses strong  ABDOMEN: soft, nontender, no hepatosplenomegaly, no masses and bowel sounds normal   (male): normal male genitalia without lesions or urethral discharge, no hernia  MS: no gross musculoskeletal defects noted, no edema  SKIN: no suspicious lesions or rashes  NEURO: Normal strength and tone, mentation intact and speech normal  PSYCH: mentation appears normal, affect normal/bright    ASSESSMENT/PLAN:   (Z00.00) Routine general medical examination at a health care facility  (primary encounter diagnosis)  Comment:    Plan:      (G47.25) Circadian rhythm sleep disorder, jet lag type  Comment:  With his travel. Infrequent use. Understands controlled substance and habit forming effects.   Plan: zolpidem (AMBIEN) 10 MG tablet             (Z13.1) Screening for diabetes mellitus  Comment:    Plan: Glucose             (Z13.220) Screening for hyperlipidemia  Comment:    Plan: Lipid panel reflex to direct LDL Non-fasting                  COUNSELING:   Reviewed preventive health counseling, as reflected in patient instructions      BMI:   Estimated body mass index is 27.34 kg/m  as calculated from the following:    Height as of 4/19/22: " "1.75 m (5' 8.9\").    Weight as of 4/19/22: 83.7 kg (184 lb 9.6 oz).   Weight management plan: Discussed healthy diet and exercise guidelines      He reports that he has never smoked. He has never used smokeless tobacco.            Ramírez Woods MD, MD  [facility]  "

## 2024-03-24 SDOH — HEALTH STABILITY: PHYSICAL HEALTH: ON AVERAGE, HOW MANY MINUTES DO YOU ENGAGE IN EXERCISE AT THIS LEVEL?: 40 MIN

## 2024-03-24 SDOH — HEALTH STABILITY: PHYSICAL HEALTH: ON AVERAGE, HOW MANY DAYS PER WEEK DO YOU ENGAGE IN MODERATE TO STRENUOUS EXERCISE (LIKE A BRISK WALK)?: 7 DAYS

## 2024-03-24 ASSESSMENT — SOCIAL DETERMINANTS OF HEALTH (SDOH): HOW OFTEN DO YOU GET TOGETHER WITH FRIENDS OR RELATIVES?: THREE TIMES A WEEK

## 2024-03-24 NOTE — COMMUNITY RESOURCES LIST (ENGLISH)
March 24, 2024           YOUR PERSONALIZED LIST OF SERVICES & PROGRAMS           & SHELTER    Housing      Free - Client Services  [address] [place] [address] (Distance: 1.9 miles)  Phone: [phone]  Website: [email or web address] [email or web address] Shorts.SnapNames/  Language: English  Fee: Free  Transportation Options: Free transportation      [facility] care home  Phone: [phone]  Website: https://www.FuturaMedia.org/  Language: English      [facility] - Housing Stabilization Services  Phone: [phone]  Website: https://Priceza/Housing-Stabilization.html  Language: English  Hours: Mon 9:00 AM - 5:00 PM Tue 9:00 AM - 5:00 PM Wed 9:00 AM - 5:00 PM Thu 9:00 AM - 5:00 PM Fri 9:00 AM - 5:00 PM  Fee: Insurance  Accessibility: Deaf or hard of hearing, Translation services    Case Management      Living - Housing Stabilization Services  [address] [place] [address] (Distance: 6.7 miles)  Phone: [phone]  Website: https://Redknee.allyve  Language: Mongolian, English, Kittitian  Fee: Insurance, Self pay      [employer] - Housing Stabilization Services  Phone: [phone]  Website: https://homebasemn.com/  Language: English  Hours: Mon 8:00 AM - 4:00 PM Tue 8:00 AM - 4:00 PM Wed 8:00 AM - 4:00 PM Thu 8:00 AM - 4:00 PM Fri 8:00 AM - 4:00 PM  Fee: Free  Accessibility: Blind accommodation, Deaf or hard of hearing  Transportation Options: Free transportation      [facility] Housing Stabilization Services (HSS)  Phone: [phone]  Website: https://www.Vascular TherapiesdayNamely.net/resources  Language: English, Nepali  Hours: Mon 8:00 AM - 4:00 PM Tue 8:00 AM - 4:00 PM Wed 8:00 AM - 4:00 PM Thu 8:00 AM - 4:00 PM Fri 8:00 AM - 4:00 PM  Fee: Free, Insurance  Accessibility: Ada accessible, Blind accommodation, Deaf or hard of hearing, Translation services    Drop-In Services      [facility] - Warming or cooling center - [facility]  [facility]  [address] [place] [address] (Distance: 2.0 miles)  Phone: [phone]  Language: English, Slovak, Hmong, Rolo  Fee: Free      [facility] - Housing & Supportive Services  [address] [place] [address] (Distance: 2.0 miles)  Phone: [phone]  Website: https://www.Adormo/housing/  Language: English      [facility] - LAUNDRY LOVE  Website: http://www.laundrylove.org               IMPORTANT NUMBERS & WEBSITES        Emergency Services  911  .   United Way  211 http://211unitedway.org  .   Poison Control  (404) 965-4667 http://[place]poison.org http://wisconsinpoison.org  .     Suicide and Crisis Lifeline  988 http://988INETCO Systems Limitedline.org  .   Childhelp [place] Child Abuse Hotline  347.542.7986 http://Childhelphotline.org   .   National Sexual Assault Hotline  (555) 106-3085 (HOPE) http://Dobleasn.org   .     National Runaway Safeline  (558) 787-3433 (RUNAWAY) http://"Alteryx, Inc."ruBetterment.Monitor Backlinks  .   Pregnancy & Postpartum Support  Call/text 078-297-9694  [place]: http://ppsupportmn.org  [place]: http://TrueView.com/wi  .   Substance Abuse National Helpline ([facility])  335-333-HELP (5940) http://Findtreatment.gov   .                DISCLAIMER: [employer] does not endorse any service providers mentioned in this resource list. [employer] does not guarantee that the services mentioned in this resource list will be available to you or will improve your health or wellness.    [facility]

## 2024-03-27 ENCOUNTER — OFFICE VISIT (OUTPATIENT)
Dept: FAMILY MEDICINE | Facility: CLINIC | Age: 39
End: 2024-03-27
Payer: COMMERCIAL

## 2024-03-27 VITALS
HEART RATE: 74 BPM | HEIGHT: 69 IN | TEMPERATURE: 97.5 F | WEIGHT: 181 LBS | DIASTOLIC BLOOD PRESSURE: 74 MMHG | SYSTOLIC BLOOD PRESSURE: 120 MMHG | OXYGEN SATURATION: 98 % | RESPIRATION RATE: 14 BRPM | BODY MASS INDEX: 26.81 KG/M2

## 2024-03-27 DIAGNOSIS — Z00.00 ROUTINE GENERAL MEDICAL EXAMINATION AT A HEALTH CARE FACILITY: Primary | ICD-10-CM

## 2024-03-27 DIAGNOSIS — Z13.220 SCREENING FOR HYPERLIPIDEMIA: ICD-10-CM

## 2024-03-27 DIAGNOSIS — Z13.1 SCREENING FOR DIABETES MELLITUS: ICD-10-CM

## 2024-03-27 DIAGNOSIS — G47.25 CIRCADIAN RHYTHM SLEEP DISORDER, JET LAG TYPE: ICD-10-CM

## 2024-03-27 LAB
CHOLEST SERPL-MCNC: 210 MG/DL
FASTING STATUS PATIENT QL REPORTED: NO
FASTING STATUS PATIENT QL REPORTED: NO
GLUCOSE SERPL-MCNC: 99 MG/DL (ref 70–99)
HDLC SERPL-MCNC: 44 MG/DL
LDLC SERPL CALC-MCNC: 153 MG/DL
NONHDLC SERPL-MCNC: 166 MG/DL
TRIGL SERPL-MCNC: 67 MG/DL

## 2024-03-27 PROCEDURE — 36415 COLL VENOUS BLD VENIPUNCTURE: CPT | Performed by: FAMILY MEDICINE

## 2024-03-27 PROCEDURE — 80061 LIPID PANEL: CPT | Performed by: FAMILY MEDICINE

## 2024-03-27 PROCEDURE — 82947 ASSAY GLUCOSE BLOOD QUANT: CPT | Performed by: FAMILY MEDICINE

## 2024-03-27 PROCEDURE — 99213 OFFICE O/P EST LOW 20 MIN: CPT | Mod: 25 | Performed by: FAMILY MEDICINE

## 2024-03-27 PROCEDURE — 99395 PREV VISIT EST AGE 18-39: CPT | Performed by: FAMILY MEDICINE

## 2024-03-27 RX ORDER — ZOLPIDEM TARTRATE 10 MG/1
5-10 TABLET ORAL
Qty: 20 TABLET | Refills: 0 | Status: SHIPPED | OUTPATIENT
Start: 2024-03-27

## 2024-03-27 ASSESSMENT — PAIN SCALES - GENERAL: PAINLEVEL: NO PAIN (0)

## 2024-03-27 NOTE — PATIENT INSTRUCTIONS
Preventive Care Advice   This is general advice given by our system to help you stay healthy. However, your care team may have specific advice just for you. Please talk to your care team about your preventive care needs.  Nutrition  Eat 5 or more servings of fruits and vegetables each day.  Try wheat bread, brown rice and whole grain pasta (instead of white bread, rice, and pasta).  Get enough calcium and vitamin D. Check the label on foods and aim for 100% of the RDA (recommended daily allowance).  Lifestyle  Exercise at least 150 minutes each week   (30 minutes a day, 5 days a week).  Do muscle strengthening activities 2 days a week. These help control your weight and prevent disease.  No smoking.  Wear sunscreen to prevent skin cancer.  Have a dental exam and cleaning every 6 months.  Yearly exams  See your health care team every year to talk about:  Any changes in your health.  Any medicines your care team has prescribed.  Preventive care, family planning, and ways to prevent chronic diseases.  Shots (vaccines)   HPV shots (up to age 26), if you've never had them before.  Hepatitis B shots (up to age 59), if you've never had them before.  COVID-19 shot: Get this shot when it's due.  Flu shot: Get a flu shot every year.  Tetanus shot: Get a tetanus shot every 10 years.  Pneumococcal, hepatitis A, and RSV shots: Ask your care team if you need these based on your risk.  Shingles shot (for age 50 and up).  General health tests  Diabetes screening:  Starting at age 35, Get screened for diabetes at least every 3 years.  If you are younger than age 35, ask your care team if you should be screened for diabetes.  Cholesterol test: At age 39, start having a cholesterol test every 5 years, or more often if advised.  Bone density scan (DEXA): At age 50, ask your care team if you should have this scan for osteoporosis (brittle bones).  Hepatitis C: Get tested at least once in your life.  STIs (sexually transmitted  infections)  Before age 24: Ask your care team if you should be screened for STIs.  After age 24: Get screened for STIs if you're at risk. You are at risk for STIs (including HIV) if:  You are sexually active with more than one person.  You don't use condoms every time.  You or a partner was diagnosed with a sexually transmitted infection.  If you are at risk for HIV, ask about PrEP medicine to prevent HIV.  Get tested for HIV at least once in your life, whether you are at risk for HIV or not.  Cancer screening tests  Cervical cancer screening: If you have a cervix, begin getting regular cervical cancer screening tests at age 21. Most people who have regular screenings with normal results can stop after age 65. Talk about this with your provider.  Breast cancer scan (mammogram): If you've ever had breasts, begin having regular mammograms starting at age 40. This is a scan to check for breast cancer.  Colon cancer screening: It is important to start screening for colon cancer at age 45.  Have a colonoscopy test every 10 years (or more often if you're at risk) Or, ask your provider about stool tests like a FIT test every year or Cologuard test every 3 years.  To learn more about your testing options, visit: https://www.Collaborate Cloud/334849.pdf.  For help making a decision, visit: https://bit.ly/ul56035.  Prostate cancer screening test: If you have a prostate and are age 55 to 69, ask your provider if you would benefit from a yearly prostate cancer screening test.  Lung cancer screening: If you are a current or former smoker age 50 to 80, ask your care team if ongoing lung cancer screenings are right for you.  For informational purposes only. Not to replace the advice of your health care provider. Copyright   2023 [employer]. All rights reserved. Clinically reviewed by the [facility] Transitions Program. [employer]pool[employer] Website 203840 - REV 01/24.    Learning About Stress  What is stress?     Stress is your  body's response to a hard situation. Your body can have a physical, emotional, or mental response. Stress is a fact of life for most people, and it affects everyone differently. What causes stress for you may not be stressful for someone else.  A lot of things can cause stress. You may feel stress when you go on a job interview, take a test, or run a race. This kind of short-term stress is normal and even useful. It can help you if you need to work hard or react quickly. For example, stress can help you finish an important job on time.  Long-term stress is caused by ongoing stressful situations or events. Examples of long-term stress include long-term health problems, ongoing problems at work, or conflicts in your family. Long-term stress can harm your health.  How does stress affect your health?  When you are stressed, your body responds as though you are in danger. It makes hormones that speed up your heart, make you breathe faster, and give you a burst of energy. This is called the fight-or-flight stress response. If the stress is over quickly, your body goes back to normal and no harm is done.  But if stress happens too often or lasts too long, it can have bad effects. Long-term stress can make you more likely to get sick, and it can make symptoms of some diseases worse. If you tense up when you are stressed, you may develop neck, shoulder, or low back pain. Stress is linked to high blood pressure and heart disease.  Stress also harms your emotional health. It can make you amezcua, tense, or depressed. Your relationships may suffer, and you may not do well at work or school.  What can you do to manage stress?  You can try these things to help manage stress:   Do something active. Exercise or activity can help reduce stress. Walking is a great way to get started. Even everyday activities such as housecleaning or yard work can help.  Try yoga or fawn chi. These techniques combine exercise and meditation. You may need  some training at first to learn them.  Do something you enjoy. For example, listen to music or go to a movie. Practice your hobby or do volunteer work.  Meditate. This can help you relax, because you are not worrying about what happened before or what may happen in the future.  Do guided imagery. Imagine yourself in any setting that helps you feel calm. You can use online videos, books, or a teacher to guide you.  Do breathing exercises. For example:  From a standing position, bend forward from the waist with your knees slightly bent. Let your arms dangle close to the floor.  Breathe in slowly and deeply as you return to a standing position. Roll up slowly and lift your head last.  Hold your breath for just a few seconds in the standing position.  Breathe out slowly and bend forward from the waist.  Let your feelings out. Talk, laugh, cry, and express anger when you need to. Talking with supportive friends or family, a counselor, or a elvin leader about your feelings is a healthy way to relieve stress. Avoid discussing your feelings with people who make you feel worse.  Write. It may help to write about things that are bothering you. This helps you find out how much stress you feel and what is causing it. When you know this, you can find better ways to cope.  What can you do to prevent stress?  You might try some of these things to help prevent stress:  Manage your time. This helps you find time to do the things you want and need to do.  Get enough sleep. Your body recovers from the stresses of the day while you are sleeping.  Get support. Your family, friends, and community can make a difference in how you experience stress.  Limit your news feed. Avoid or limit time on social media or news that may make you feel stressed.  Do something active. Exercise or activity can help reduce stress. Walking is a great way to get started.  Where can you learn more?  Go to https://www.healthwise.net/patiented  Enter N032 in the  "search box to learn more about \"Learning About Stress.\"  Current as of: October 24, 2023               Content Version: 14.0    6849-4729 Revolv.   Care instructions adapted under license by your healthcare professional. If you have questions about a medical condition or this instruction, always ask your healthcare professional. [employer] disclaims any warranty or liability for your use of this information.      Substance Use Disorder: Care Instructions  Overview     You can improve your life and health by stopping your use of alcohol or drugs. When you don't drink or use drugs, you may feel and sleep better. You may get along better with your family, friends, and coworkers. There are medicines and programs that can help with substance use disorder.  How can you care for yourself at home?  Here are some ways to help you stay sober and prevent relapse.  If you have been given medicine to help keep you sober or reduce your cravings, be sure to take it exactly as prescribed.  Talk to your doctor about programs that can help you stop using drugs or drinking alcohol.  Do not keep alcohol or drugs in your home.  Plan ahead. Think about what you'll say if other people ask you to drink or use drugs. Try not to spend time with people who drink or use drugs.  Use the time and money spent on drinking or drugs to do something that's important to you.  Preventing a relapse  Have a plan to deal with relapse. Learn to recognize changes in your thinking that lead you to drink or use drugs. Get help before you start to drink or use drugs again.  Try to stay away from situations, friends, or places that may lead you to drink or use drugs.  If you feel the need to drink alcohol or use drugs again, seek help right away. Call a trusted friend or family member. Some people get support from organizations such as Narcotics Anonymous or [employer] [facility] [employer] or from treatment facilities.  If you relapse, get help " as soon as you can. Some people make a plan with another person that outlines what they want that person to do for them if they relapse. The plan usually includes how to handle the relapse and who to notify in case of relapse.  Don't give up. Remember that a relapse doesn't mean that you have failed. Use the experience to learn the triggers that lead you to drink or use drugs. Then quit again. Recovery is a lifelong process. Many people have several relapses before they are able to quit for good.  Follow-up care is a key part of your treatment and safety. Be sure to make and go to all appointments, and call your doctor if you are having problems. It's also a good idea to know your test results and keep a list of the medicines you take.  When should you call for help?   Call 911  anytime you think you may need emergency care. For example, call if you or someone else:    Has overdosed or has withdrawal signs. Be sure to tell the emergency workers that you are or someone else is using or trying to quit using drugs. Overdose or withdrawal signs may include:  Losing consciousness.  Seizure.  Seeing or hearing things that aren't there (hallucinations).     Is thinking or talking about suicide or harming others.   Where to get help 24 hours a day, 7 days a week   If you or someone you know talks about suicide, self-harm, a mental health crisis, a substance use crisis, or any other kind of emotional distress, get help right away. You can:    Call the Suicide and Crisis Lifeline at 989.     Call 0-877-810-TALK (1-587.440.6690).     Text HOME to 419898 to access the Crisis Text Line.   Consider saving these numbers in your phone.  Go to Vendigiline.org for more information or to chat online.  Call your doctor now or seek immediate medical care if:    You are having withdrawal symptoms. These may include nausea or vomiting, sweating, shakiness, and anxiety.   Watch closely for changes in your health, and be sure to contact  "your doctor if:    You have a relapse.     You need more help or support to stop.   Where can you learn more?  Go to https://www.healthIvera Medical.net/patiented  Enter H573 in the search box to learn more about \"Substance Use Disorder: Care Instructions.\"  Current as of: November 15, 2023               Content Version: 14.0    5466-7634 [employer]oscopic.   Care instructions adapted under license by your healthcare professional. If you have questions about a medical condition or this instruction, always ask your healthcare professional. Healthwise, [employer] disclaims any warranty or liability for your use of this information.      "

## 2024-03-27 NOTE — PROGRESS NOTES
"Preventive Care Visit  [facility]  Ramírez Ernesto Woods MD, MD, Family Medicine  Mar 27, 2024      Assessment & Plan     Routine general medical examination at a health care facility       Circadian rhythm sleep disorder, jet lag type   Infrequent use for international travel. OK to refill  - zolpidem (AMBIEN) 10 MG tablet; Take 0.5-1 tablets (5-10 mg) by mouth nightly as needed for sleep    Screening for diabetes mellitus     - Glucose; Future  - Glucose    Screening for hyperlipidemia   Monitor lipid.   - Lipid panel reflex to direct LDL Fasting; Future  - Lipid panel reflex to direct LDL Fasting              BMI  Estimated body mass index is 26.73 kg/m  as calculated from the following:    Height as of this encounter: 1.753 m (5' 9\").    Weight as of this encounter: 82.1 kg (181 lb).   Weight management plan: Discussed healthy diet and exercise guidelines    Counseling  Appropriate preventive services were discussed with this patient, including applicable screening as appropriate for fall prevention, nutrition, physical activity, Tobacco-use cessation, weight loss and cognition.  Checklist reviewing preventive services available has been given to the patient.  Reviewed patient's diet, addressing concerns and/or questions.            Keesha Carrillo is a 39 year old, presenting for the following:  Physical        3/27/2024     8:26 AM   Additional Questions   Roomed by Floridalma BARNHART        Health Care Directive  Patient does not have a Health Care Directive or Living Will: Discussed advance care planning with patient; information given to patient to review.    HPI    Work out daily. Will work on diet.   No major change in health.   Some work stress.   Uses ambien when travels internationally.   Couple times per year needs ambien when travelling. Stretches.   With same partner. Thinking about conception.         3/24/2024   General Health   How would you rate your overall physical " health? Good   Feel stress (tense, anxious, or unable to sleep) To some extent   (!) STRESS CONCERN      3/24/2024   Nutrition   Three or more servings of calcium each day? Yes   Diet: Other   If other, please elaborate: Minimal bread   How many servings of fruit and vegetables per day? (!) 2-3   How many sweetened beverages each day? 0-1         3/24/2024   Exercise   Days per week of moderate/strenous exercise 7 days   Average minutes spent exercising at this level 40 min         3/24/2024   Social Factors   Frequency of gathering with friends or relatives Three times a week   Worry food won't last until get money to buy more No   Food not last or not have enough money for food? No   Do you have housing?  No   Are you worried about losing your housing? No   Lack of transportation? No   Unable to get utilities (heat,electricity)? No   Want help with housing or utility concern? No   (!) HOUSING CONCERN PRESENT      3/24/2024   Dental   Dentist two times every year? Yes         3/24/2024   TB Screening   Were you born outside of the [place]? No     Today's PHQ-2 Score:       3/27/2024     8:23 AM   PHQ-2 ( 1999 Pfizer)   Q1: Little interest or pleasure in doing things 0   Q2: Feeling down, depressed or hopeless 0   PHQ-2 Score 0   Q1: Little interest or pleasure in doing things Not at all   Q2: Feeling down, depressed or hopeless Not at all   PHQ-2 Score 0         3/24/2024   Substance Use   Alcohol more than 3/day or more than 7/wk No   Do you use any other substances recreationally? (!) ALCOHOL    (!) CANNABIS PRODUCTS     Social History     Tobacco Use    Smoking status: Never    Smokeless tobacco: Never   Substance Use Topics    Alcohol use: Yes     Comment: couple beers a month    Drug use: No           3/24/2024   STI Screening   New sexual partner(s) since last STI/HIV test? No         3/24/2024   Contraception/Family Planning   Questions about contraception or family planning (!) YES          Reviewed and updated  "as needed this visit by Provider       Objective    Exam  /74 (BP Location: Right arm, Patient Position: Sitting, Cuff Size: Adult Regular)   Pulse 74   Temp 97.5  F (36.4  C) (Temporal)   Resp 14   Ht 1.753 m (5' 9\")   Wt 82.1 kg (181 lb)   SpO2 98%   BMI 26.73 kg/m     Estimated body mass index is 26.73 kg/m  as calculated from the following:    Height as of this encounter: 1.753 m (5' 9\").    Weight as of this encounter: 82.1 kg (181 lb).    Physical Exam  GENERAL: alert and no distress  EYES: Eyes grossly normal to inspection, PERRL and conjunctivae and sclerae normal  HENT: ear canals and TM's normal, nose and mouth without ulcers or lesions  NECK: no adenopathy, no asymmetry, masses, or scars  RESP: lungs clear to auscultation - no rales, rhonchi or wheezes  CV: regular rate and rhythm, normal S1 S2, no S3 or S4, no murmur, click or rub, no peripheral edema  ABDOMEN: soft, nontender, no hepatosplenomegaly, no masses and bowel sounds normal   (male): normal male genitalia without lesions or urethral discharge, no hernia  MS: no gross musculoskeletal defects noted, no edema, right great toe bunion.   SKIN: no suspicious lesions or rashes  NEURO: Normal strength and tone, mentation intact and speech normal  PSYCH: mentation appears normal, affect normal/bright    Signed Electronically by: Ramírez Woods MD, MD    "

## 2024-04-23 ENCOUNTER — OFFICE VISIT (OUTPATIENT)
Dept: FAMILY MEDICINE | Facility: CLINIC | Age: 39
End: 2024-04-23
Payer: COMMERCIAL

## 2024-04-23 ENCOUNTER — TRANSCRIBE ORDERS (OUTPATIENT)
Dept: MATERNAL FETAL MEDICINE | Facility: CLINIC | Age: 39
End: 2024-04-23

## 2024-04-23 VITALS
WEIGHT: 183.5 LBS | DIASTOLIC BLOOD PRESSURE: 73 MMHG | SYSTOLIC BLOOD PRESSURE: 152 MMHG | RESPIRATION RATE: 16 BRPM | HEART RATE: 71 BPM | TEMPERATURE: 97.6 F | HEIGHT: 69 IN | BODY MASS INDEX: 27.18 KG/M2 | OXYGEN SATURATION: 99 %

## 2024-04-23 DIAGNOSIS — R03.0 ELEVATED BP WITHOUT DIAGNOSIS OF HYPERTENSION: ICD-10-CM

## 2024-04-23 DIAGNOSIS — Z02.89 ENCOUNTER FOR OTHER ADMINISTRATIVE EXAMINATIONS: Primary | ICD-10-CM

## 2024-04-23 DIAGNOSIS — Z31.69 ENCOUNTER FOR PRECONCEPTION CONSULTATION: Primary | ICD-10-CM

## 2024-04-23 PROCEDURE — 99213 OFFICE O/P EST LOW 20 MIN: CPT | Performed by: FAMILY MEDICINE

## 2024-04-23 ASSESSMENT — PAIN SCALES - GENERAL: PAINLEVEL: NO PAIN (0)

## 2024-04-23 NOTE — PROGRESS NOTES
"  Assessment & Plan     Encounter for other administrative examinations  Hoping to have preconception genetic testing. Ordered. Briefly talked about test, insurance coverage. He will talk to his partner.   - [facility] Referral - Preconception; Future    Elevated BP without diagnosis of hypertension  Will check bp at home. Will notify me via Magistohart in a month. Currently stressed due to above.                   Keesha Carrillo is a 39 year old, presenting for the following health issues:  family planning        4/23/2024     7:23 AM   Additional Questions   Roomed by Martha DIGGS     History of Present Illness       Reason for visit:  Genetic screening for family planning    He eats 2-3 servings of fruits and vegetables daily.He consumes 1 sweetened beverage(s) daily.He exercises with enough effort to increase his heart rate 30 to 60 minutes per day.  He exercises with enough effort to increase his heart rate 6 days per week.   He is taking medications regularly.     Got engaged last month.   Family genetics - no concerns but norma suggested he should consider doing that.   She was positive for schilders disease via gene testing.   He is not too excited about genetic testing and nervous about it. It may be the cause of high bp today.          Objective    BP (!) 152/73   Pulse 71   Temp 97.6  F (36.4  C) (Temporal)   Resp 16   Ht 1.746 m (5' 8.74\")   Wt 83.2 kg (183 lb 8 oz)   SpO2 99%   BMI 27.30 kg/m    Body mass index is 27.3 kg/m .  Physical Exam               Signed Electronically by: Ramírez Woods MD, MD    "

## 2024-04-23 NOTE — PATIENT INSTRUCTIONS
Send your bp readings in a month.       =======================================  FYI:     System will autorelease results as soon as they are available. I usually wait for all results to be ready before sending you a comment or message.  Be assured I will review and comment on all of your results as soon as I can.     I am at [facility]  ([phone]) usually Monday, Tuesday and Wednesday.   Messages, evisits, phone calls received on Thursday and Friday may not get responded very urgently but I will try to respond as soon as possible.     2) My schedule sometime been booking out far in advance. I apologize for the lack of timely access. If you need to be seen for a chronic condition or preventive (wellness) visit, please be sure to schedule that appointment few months in advance.  If you have a concern that you feel cannot wait until my next available appointment (such as a hospital follow-up or new symptom of concern) please ask to speak to one of the [place] nurses who may be able to access a sooner appointment.      You can schedule a video visit for follow-up appointments as well as future appointments for certain conditions.  Please see the below link.     Video Visits ([email or web address])     If you have not already done so,  I encourage you to sign up for [employer] ([email or web address]).  This will allow you to review your results, securely communicate with a provider, and schedule virtual visits as well.

## 2024-09-17 ASSESSMENT — ANXIETY QUESTIONNAIRES
8. IF YOU CHECKED OFF ANY PROBLEMS, HOW DIFFICULT HAVE THESE MADE IT FOR YOU TO DO YOUR WORK, TAKE CARE OF THINGS AT HOME, OR GET ALONG WITH OTHER PEOPLE?: NOT DIFFICULT AT ALL
GAD7 TOTAL SCORE: 6
7. FEELING AFRAID AS IF SOMETHING AWFUL MIGHT HAPPEN: SEVERAL DAYS
GAD7 TOTAL SCORE: 6
GAD7 TOTAL SCORE: 6

## 2024-09-18 ENCOUNTER — OFFICE VISIT (OUTPATIENT)
Dept: FAMILY MEDICINE | Facility: CLINIC | Age: 39
End: 2024-09-18
Payer: COMMERCIAL

## 2024-09-18 VITALS
SYSTOLIC BLOOD PRESSURE: 164 MMHG | HEART RATE: 88 BPM | RESPIRATION RATE: 18 BRPM | DIASTOLIC BLOOD PRESSURE: 73 MMHG | TEMPERATURE: 97.2 F | WEIGHT: 178.2 LBS | HEIGHT: 69 IN | OXYGEN SATURATION: 99 % | BODY MASS INDEX: 26.39 KG/M2

## 2024-09-18 DIAGNOSIS — R03.0 ELEVATED BP WITHOUT DIAGNOSIS OF HYPERTENSION: ICD-10-CM

## 2024-09-18 DIAGNOSIS — F41.9 ANXIETY: Primary | ICD-10-CM

## 2024-09-18 PROCEDURE — 90656 IIV3 VACC NO PRSV 0.5 ML IM: CPT | Performed by: FAMILY MEDICINE

## 2024-09-18 PROCEDURE — 90480 ADMN SARSCOV2 VAC 1/ONLY CMP: CPT | Performed by: FAMILY MEDICINE

## 2024-09-18 PROCEDURE — 99214 OFFICE O/P EST MOD 30 MIN: CPT | Mod: 25 | Performed by: FAMILY MEDICINE

## 2024-09-18 PROCEDURE — 90471 IMMUNIZATION ADMIN: CPT | Performed by: FAMILY MEDICINE

## 2024-09-18 PROCEDURE — 91320 SARSCV2 VAC 30MCG TRS-SUC IM: CPT | Performed by: FAMILY MEDICINE

## 2024-09-18 RX ORDER — HYDROXYZINE HYDROCHLORIDE 25 MG/1
25 TABLET, FILM COATED ORAL 3 TIMES DAILY PRN
Qty: 90 TABLET | Refills: 0 | Status: SHIPPED | OUTPATIENT
Start: 2024-09-18

## 2024-09-18 ASSESSMENT — ENCOUNTER SYMPTOMS: NERVOUS/ANXIOUS: 1

## 2024-09-18 ASSESSMENT — PAIN SCALES - GENERAL: PAINLEVEL: NO PAIN (0)

## 2024-09-18 NOTE — PROGRESS NOTES
"  Assessment & Plan     Anxiety  I suspect some baseline anxiety which is worse due to insomnia, upcoming marriage. Poor sleep may be contributing   We talked about daily vs prn vs both meds. We talked about ssri, benzos, propranolol and hydroxizine. We agreed to startr with hydroxyzine as it would help with sleep also.   - hydrOXYzine HCl (ATARAX) 25 MG tablet; Take 1 tablet (25 mg) by mouth 3 times daily as needed for anxiety.    Elevated BP without diagnosis of hypertension  I suspect he may have underlying HTN but due to above, I believe he would benefit from first trying anxiolytic and see if helps with sleep, anxiety and may help with bp. He will monitor his bp at home and will send me [employer] message. If it remains high, we will see him back and start him on antihypertensive.           BMI  Estimated body mass index is 26.45 kg/m  as calculated from the following:    Height as of this encounter: 1.748 m (5' 8.82\").    Weight as of this encounter: 80.8 kg (178 lb 3.2 oz).             Keesha Carrillo is a 39 year old, presenting for the following health issues:  Insomnia, Anxiety, and Hypertension        9/18/2024     7:48 AM   Additional Questions   Roomed by Martha DIGGS     History of Present Illness       Mental Health Follow-up:  Patient presents to follow-up on Anxiety.    Patient's anxiety since last visit has been:  Worse  The patient is not having other symptoms associated with anxiety.  Any significant life events: other  Patient is feeling anxious or having panic attacks.  Patient has no concerns about alcohol or drug use.    Hypertension: He presents for follow up of hypertension.  He does not check blood pressure  regularly outside of the clinic. Outpatient blood pressures have not been over 140/90. He does not follow a low salt diet.     He eats 2-3 servings of fruits and vegetables daily.He consumes 1 sweetened beverage(s) daily.He exercises with enough effort to increase his heart rate 30 to 60 " "minutes per day.  He exercises with enough effort to increase his heart rate 7 days per week.   He is taking medications regularly.     Getting  in a month.   Busy with it.   Ambien only with travel. Sleep is not great.   Last night took ambien and slept fine.     Some family history of HTN.     Working out for an hour, has intense job. Anxiety is part of how he is feeling. During covid some panic attacks.     Has bp machine at home but does not check it.          Objective    BP (!) 164/73   Pulse 88   Temp 97.2  F (36.2  C) (Temporal)   Resp 18   Ht 1.748 m (5' 8.82\")   Wt 80.8 kg (178 lb 3.2 oz)   SpO2 99%   BMI 26.45 kg/m    Body mass index is 26.45 kg/m .  Physical Exam             The longitudinal plan of care for the diagnosis(es)/condition(s) as documented were addressed during this visit. Due to the added complexity in care, I will continue to support Gerardo in the subsequent management and with ongoing continuity of care.  Signed Electronically by: Ramírez Woods MD, MD    "

## 2024-09-18 NOTE — PATIENT INSTRUCTIONS
At home check bp around 3-4 times per week for next 1 month.   Goal is below 140/90 - both numbers needs to be below goal.   Ideal bp is around 120/80 or less.   Salt reduction, sodium reduction, cardio exercise, meditation and anxiety reduction helps bp.         =======================================  FYI:     System will autorelease results as soon as they are available. I usually wait for all results to be ready before sending you a comment or message.  Be assured I will review and comment on all of your results as soon as I can.     I am at [facility]  ([phone]) usually Monday, Tuesday and Wednesday.   Messages, evisits, phone calls received on Thursday and Friday may not get responded very urgently but I will try to respond as soon as possible.     You can schedule a video visit through this link Video Visits ([email or web address])     Sign up for [employer] ([email or web address]).  This will allow you to review your results, securely communicate with a provider, and schedule virtual visits as well.

## 2024-10-15 DIAGNOSIS — F41.9 ANXIETY: ICD-10-CM

## 2024-10-15 RX ORDER — HYDROXYZINE HYDROCHLORIDE 25 MG/1
25 TABLET, FILM COATED ORAL 3 TIMES DAILY PRN
Qty: 90 TABLET | Refills: 0 | OUTPATIENT
Start: 2024-10-15

## 2024-10-25 ENCOUNTER — MYC MEDICAL ADVICE (OUTPATIENT)
Dept: FAMILY MEDICINE | Facility: CLINIC | Age: 39
End: 2024-10-25
Payer: COMMERCIAL

## 2024-10-25 DIAGNOSIS — F41.9 ANXIETY: ICD-10-CM

## 2024-10-28 NOTE — TELEPHONE ENCOUNTER
Dr. Woods-Please review patient's message and advise if you would like patient to schedule a BP follow up visit with you?    Most recent BP entered into patient reported vitals.    Thank you!  MICHELLE BarN, RN-[facility]

## 2024-10-29 RX ORDER — HYDROXYZINE HYDROCHLORIDE 25 MG/1
25 TABLET, FILM COATED ORAL 3 TIMES DAILY PRN
Qty: 90 TABLET | Refills: 0 | Status: SHIPPED | OUTPATIENT
Start: 2024-10-29

## 2024-10-29 NOTE — TELEPHONE ENCOUNTER
Relayed POC via MyChart.    KRISTIN Moraes, MICHELLEN, RN (she/her)  [facility] RN

## 2024-10-29 NOTE — TELEPHONE ENCOUNTER
Monitor bp around 2-3 times per week and as long as it is below 140/90 average - ok to monitor.   If getting worse- should be seen in the clinic. Refilled hydroxyzine.

## 2024-12-17 ENCOUNTER — OFFICE VISIT (OUTPATIENT)
Dept: DERMATOLOGY | Facility: CLINIC | Age: 39
End: 2024-12-17
Payer: COMMERCIAL

## 2024-12-17 DIAGNOSIS — D22.9 MULTIPLE BENIGN NEVI: ICD-10-CM

## 2024-12-17 DIAGNOSIS — L57.0 ACTINIC KERATOSIS: ICD-10-CM

## 2024-12-17 DIAGNOSIS — D48.5 NEOPLASM OF UNCERTAIN BEHAVIOR OF SKIN: Primary | ICD-10-CM

## 2024-12-17 DIAGNOSIS — D18.01 CHERRY ANGIOMA: ICD-10-CM

## 2024-12-17 DIAGNOSIS — L81.4 LENTIGINES: ICD-10-CM

## 2024-12-17 PROCEDURE — 88305 TISSUE EXAM BY PATHOLOGIST: CPT | Mod: TC | Performed by: DERMATOLOGY

## 2024-12-17 PROCEDURE — 88305 TISSUE EXAM BY PATHOLOGIST: CPT | Mod: 26 | Performed by: PATHOLOGY

## 2024-12-17 ASSESSMENT — PAIN SCALES - GENERAL: PAINLEVEL_OUTOF10: NO PAIN (0)

## 2024-12-17 NOTE — LETTER
12/17/2024       RE: Vazquez Rodriguez  [address]  [place] [address]     Dear Colleague,    Thank you for referring your patient, Vazquez Rodriguez, to the [facility] [place] [facility]. Please see a copy of my visit note below.    [facility] Dermatology Note  Encounter Date: Dec 17, 2024  Office Visit     Dermatology Problem List:  FBSE 12/17/24  # Neoplasm of uncertain behavior  - L lateral thigh, dermatofibroma v BCC v other, bx 12/17/24  # AK  - L sideburn, cryo 12/17/24      ____________________________________________    Assessment & Plan:   # Neoplasm of uncertain behavior   - L lateral thigh, dermatofibroma v BCC v other, shave biopsy today, procedure note below.     # Actinic keratosis  - L sideburn.  - Discussed etiology and standard treatment of cryo, which he is agreeable to.  - Procedure note below.     # Benign skin lesions: cherry angiomas, multiple benign nevi, lentigines  - Reassured benign etiology.  - No treatment required today.   - Recommended diligent sun protection with SPF 30 or higher. Handout provided.  - Discussed signs and symptoms of skin cancers and ABCDEs of melanoma.        Procedures Performed:   - Shave biopsy procedure note, location(s): L lateral thigh. After discussion of benefits and risks including but not limited to bleeding, infection, scar, incomplete removal, recurrence, and non-diagnostic biopsy, verbal consent and photographs were obtained. The area was cleaned with isopropyl alcohol. 0.5mL of 1% lidocaine with epinephrine was injected to obtain adequate anesthesia of lesion(s). Shave biopsy at site(s) performed. Hemostasis was achieved with aluminium chloride. Petrolatum ointment and a sterile dressing were applied. The patient tolerated the procedure and no complications were noted. The patient was provided with verbal and written post care instructions.     -  Cryotherapy procedure note, location(s): L sideburn. After verbal consent and discussion of risks and benefits including, but not limited to, dyspigmentation/scar, blister, and pain, 1 lesion(s) was(were) treated with 1-2 mm freeze border for 1-2 cycles with liquid nitrogen. Post cryotherapy instructions were provided.        Follow-up: 1yr    Staff and Resident:     Resident:  I saw and discussed the patient with the attending physician, Dr. Seals.      Elaido Pastor MD, PhD  PGY-4 Dermatology Resident       I was present for key portions of the biopsy and the entire cryotherapy procedure. Nori Seals MD   I, Nori Seals MD, saw this patient with the resident and agree with the resident s findings and plan of care as documented in the resident s note.          ____________________________________________    CC: No chief complaint on file.      HPI:  Mr. Vazquez Rodriguez is a(n) 39 year old male who presents today as a return patient for skin check.    - Last seen for skin check 3yr ago, no concerning findings at that time.   - Nothing growing, changing, painful, or bleeding.  - Just got back from [facility] in [place] and [facility].    Patient is otherwise feeling well, without additional skin concerns.    Labs Reviewed:  N/A    Physical Exam:  Vitals: There were no vitals taken for this visit.  SKIN: Full skin, which includes the head/face, both arms, chest, back, abdomen,both legs, genitalia and/or groin buttocks, digits and/or nails, was examined.  - L lateral thigh 8mm shiny pink domed papule.   - Gritty pink papule L sideburn.   - There are dome shaped bright red papules on the trunk and extremities.   - There are waxy stuck on tan to brown papules on the trunk and extremities.   - Scattered brown macules on sun exposed areas.  - Scattered light to dark brown macules and papules on the trunk and extremities without concerning dermatoscopic features.   - No other lesions of concern on areas  examined.                 Medications:  Current Outpatient Medications   Medication Sig Dispense Refill     hydrOXYzine HCl (ATARAX) 25 MG tablet Take 1 tablet (25 mg) by mouth 3 times daily as needed for anxiety. 90 tablet 0     zolpidem (AMBIEN) 10 MG tablet Take 0.5-1 tablets (5-10 mg) by mouth nightly as needed for sleep (Patient not taking: Reported on 9/18/2024) 20 tablet 0     No current facility-administered medications for this visit.        Past Medical History:   Patient Active Problem List   Diagnosis     CARDIOVASCULAR SCREENING; LDL GOAL LESS THAN 160     Past Medical History:   Diagnosis Date     NO ACTIVE PROBLEMS        CC Nori Seals MD  [address] [place] [address] [address]  [place] [address] on close of this encounter.        Again, thank you for allowing me to participate in the care of your patient.      Sincerely,    Nori Seals MD

## 2024-12-17 NOTE — PROGRESS NOTES
[facility] Dermatology Note  Encounter Date: Dec 17, 2024  Office Visit     Dermatology Problem List:  FBSE 12/17/24  # Neoplasm of uncertain behavior  - L lateral thigh, dermatofibroma v BCC v other, bx 12/17/24  # AK  - L sideburn, cryo 12/17/24      ____________________________________________    Assessment & Plan:   # Neoplasm of uncertain behavior   - L lateral thigh, dermatofibroma v BCC v other, shave biopsy today, procedure note below.     # Actinic keratosis  - L sideburn.  - Discussed etiology and standard treatment of cryo, which he is agreeable to.  - Procedure note below.     # Benign skin lesions: cherry angiomas, multiple benign nevi, lentigines  - Reassured benign etiology.  - No treatment required today.   - Recommended diligent sun protection with SPF 30 or higher. Handout provided.  - Discussed signs and symptoms of skin cancers and ABCDEs of melanoma.        Procedures Performed:   - Shave biopsy procedure note, location(s): L lateral thigh. After discussion of benefits and risks including but not limited to bleeding, infection, scar, incomplete removal, recurrence, and non-diagnostic biopsy, verbal consent and photographs were obtained. The area was cleaned with isopropyl alcohol. 0.5mL of 1% lidocaine with epinephrine was injected to obtain adequate anesthesia of lesion(s). Shave biopsy at site(s) performed. Hemostasis was achieved with aluminium chloride. Petrolatum ointment and a sterile dressing were applied. The patient tolerated the procedure and no complications were noted. The patient was provided with verbal and written post care instructions.     - Cryotherapy procedure note, location(s): L sideburn. After verbal consent and discussion of risks and benefits including, but not limited to, dyspigmentation/scar, blister, and pain, 1 lesion(s) was(were) treated with 1-2 mm freeze border for 1-2 cycles with liquid nitrogen. Post cryotherapy instructions were  provided.        Follow-up: 1yr    Staff and Resident:     Resident:  I saw and discussed the patient with the attending physician, Dr. Seals.      Eladio Pastor MD, PhD  PGY-4 Dermatology Resident       I was present for key portions of the biopsy and the entire cryotherapy procedure. Nori Seals MD   I, Nori Seals MD, saw this patient with the resident and agree with the resident s findings and plan of care as documented in the resident s note.          ____________________________________________    CC: No chief complaint on file.      HPI:  Mr. Vazquez Rodriguez is a(n) 39 year old male who presents today as a return patient for skin check.    - Last seen for skin check 3yr ago, no concerning findings at that time.   - Nothing growing, changing, painful, or bleeding.  - Just got back from [facility] in [place] and [place].    Patient is otherwise feeling well, without additional skin concerns.    Labs Reviewed:  N/A    Physical Exam:  Vitals: There were no vitals taken for this visit.  SKIN: Full skin, which includes the head/face, both arms, chest, back, abdomen,both legs, genitalia and/or groin buttocks, digits and/or nails, was examined.  - L lateral thigh 8mm shiny pink domed papule.   - Gritty pink papule L sideburn.   - There are dome shaped bright red papules on the trunk and extremities.   - There are waxy stuck on tan to brown papules on the trunk and extremities.   - Scattered brown macules on sun exposed areas.  - Scattered light to dark brown macules and papules on the trunk and extremities without concerning dermatoscopic features.   - No other lesions of concern on areas examined.                 Medications:  Current Outpatient Medications   Medication Sig Dispense Refill    hydrOXYzine HCl (ATARAX) 25 MG tablet Take 1 tablet (25 mg) by mouth 3 times daily as needed for anxiety. 90 tablet 0    zolpidem (AMBIEN) 10 MG tablet Take 0.5-1 tablets (5-10 mg) by mouth nightly as  needed for sleep (Patient not taking: Reported on 9/18/2024) 20 tablet 0     No current facility-administered medications for this visit.        Past Medical History:   Patient Active Problem List   Diagnosis    CARDIOVASCULAR SCREENING; LDL GOAL LESS THAN 160     Past Medical History:   Diagnosis Date    NO ACTIVE PROBLEMS        CC Nori Seals MD  [address] [place] [address] [address]  [place] [address] on close of this encounter.

## 2024-12-17 NOTE — NURSING NOTE
Dermatology Rooming Note    Vazquez Rodriguez's goals for this visit include:   Chief Complaint   Patient presents with    Skin Check     Has one spot on the face he is concerned about.      Michael Velez, EMT  Clinic Support  [facility]     [phone]    Employed by [facility]

## 2024-12-17 NOTE — NURSING NOTE
Lidocaine-epinephrine 1-1:710327 % injection   1 mL once for one use, starting 12/17/2024 ending 12/17/2024,  2mL disp, R-0, injection  Injected by Juan Pablo DIGGS CMA

## 2024-12-17 NOTE — PATIENT INSTRUCTIONS
Wound Care After a Biopsy    What is a skin biopsy?  A skin biopsy allows the doctor to examine a very small piece of tissue under the microscope to determine the diagnosis and the best treatment for the skin condition. A local anesthetic (numbing medicine) is injected with a very small needle into the skin area to be tested. A small piece of skin is taken from the area. Sometimes a suture (stitch) is used.     What are the risks of a skin biopsy?  I will experience scar, bleeding, swelling, pain, crusting and redness. I may experience incomplete removal or recurrence. Risks of this procedure are excessive bleeding, bruising, infection, nerve damage, numbness, thick (hypertrophic or keloidal) scar and non-diagnostic biopsy.    How should I care for my wound for the first 24 hours?  Keep the wound dry and covered for 24 hours  If it bleeds, hold direct pressure on the area for 15 minutes. If bleeding does not stop, call us or go to the emergency room  Avoid strenuous exercise the first 1-2 days or as your doctor instructs you    How should I care for the wound after 24 hours?  After 24 hours, remove the bandage  You may bathe or shower as normal  If you had a scalp biopsy, you can shampoo as usual and can use shower water to clean the biopsy site daily  Clean the wound once a day with gentle soap and water  Do not scrub, be gentle  Apply white petroleum/Vaseline after cleaning the wound with a cotton swab or a clean finger, and keep the site covered with a Bandaid /bandage. Bandages are not necessary with a scalp biopsy  If you are unable to cover the site with a Bandaid /bandage, re-apply ointment 2-3 times a day to keep the site moist. Moisture will help with healing  Avoid strenuous activity for first 1-2 days  Avoid lakes, rivers, pools, and oceans until the stitches are removed or the site is healed    How do I clean my wound?  Wash hands thoroughly with soap or use hand  before all wound care  Clean  the wound with gentle soap and water  Apply white petroleum/Vaseline  to wound after it is clean  Replace the Bandaid /bandage to keep the wound covered for the first few days or as instructed by your doctor  If you had a scalp biopsy, warm shower water to the area on a daily basis should suffice    What should I use to clean my wound?   Cotton-tipped applicators (Qtips )  White petroleum jelly (Vaseline ). Use a clean new container and use Q-tips to apply.  Bandaids  as needed  Gentle soap     How should I care for my wound long term?  Do not get your wound dirty  Keep up with wound care for one week or until the area is healed.  If you have stitches, stitches need to be removed in 14 days. You may return to our clinic for this or you may have it done locally at your doctor s office.  A small scab will form and fall off by itself when the area is completely healed. The area will be red and will become pink in color as it heals. Sun protection is very important for how your scar will turn out. Sunscreen with an SPF 30 or greater is recommended once the area is healed.  You should have some soreness but it should be mild and slowly go away over several days. Talk to your doctor about using tylenol for pain,    When should I call my doctor?  If you have increased:   Pain or swelling  Pus or drainage (clear or slightly yellow drainage is ok)  Temperature over 100F  Spreading redness or warmth around wound    When will I hear about my results?  The biopsy results can take 2 weeks to come back.  Your results will automatically release to [employer] before your provider has even reviewed them.  The clinic will call you with the results, send you a [employer] message, or have you schedule a follow-up clinic or phone time to discuss the results.  Contact our clinics if you do not hear from us in 2 weeks.    Who should I call with questions?  [facility]: [phone]  [facility]  [facility]: [phone]  For urgent needs outside of business hours call the [facility] at [phone] and ask for the dermatology resident on call

## 2024-12-18 LAB
PATH REPORT.COMMENTS IMP SPEC: NORMAL
PATH REPORT.COMMENTS IMP SPEC: NORMAL
PATH REPORT.FINAL DX SPEC: NORMAL
PATH REPORT.GROSS SPEC: NORMAL
PATH REPORT.MICROSCOPIC SPEC OTHER STN: NORMAL
PATH REPORT.RELEVANT HX SPEC: NORMAL

## 2025-01-11 ENCOUNTER — VIRTUAL VISIT (OUTPATIENT)
Dept: URGENT CARE | Facility: CLINIC | Age: 40
End: 2025-01-11
Payer: COMMERCIAL

## 2025-01-11 DIAGNOSIS — R05.1 ACUTE COUGH: Primary | ICD-10-CM

## 2025-01-11 PROCEDURE — 98005 SYNCH AUDIO-VIDEO EST LOW 20: CPT

## 2025-01-11 RX ORDER — AZITHROMYCIN 250 MG/1
TABLET, FILM COATED ORAL
Qty: 6 TABLET | Refills: 0 | Status: SHIPPED | OUTPATIENT
Start: 2025-01-11 | End: 2025-01-16

## 2025-01-11 NOTE — PROGRESS NOTES
"  Vazquez Rodriguez is a 39 year old male who is being evaluated via a billable video visit.      The patient has been notified of following at the time of scheduling video visit:     \"This video visit will be conducted via a video call between you and your physician/provider. We have found that certain health care needs can be provided without the need for a physical exam.  This service lets us provide the care you need with a video conversation.  If a prescription is necessary we can send it directly to your pharmacy.  If lab work is needed we can place an order for that and you can then stop by our lab to have the test done at a later time.\"   Patient has given consent for video visit?  YES    SUBJECTIVE:  Vazquez Rodriguez is an 39 year old male who presents for cough.  About 3 weeks ago developed a cold with cough, headache, mild nasal congestion and fatigue.  It gradually improved but never fully resolved.  Continues to have some cough which is not productive.  Sometimes will cough several times in a row.  No fevers.  Minimal nasal congestion.  No n/v/d.  Advil and mucinex have helped a little.  No shortness of breath or wheezing.  Is able to do most of his usual activities.    PMH:   has a past medical history of NO ACTIVE PROBLEMS.  Patient Active Problem List   Diagnosis    CARDIOVASCULAR SCREENING; LDL GOAL LESS THAN 160     Social History     Socioeconomic History    Marital status: Single   Occupational History    Occupation: student     Employer: [employer]UDENT     Comment: civil engineering, [place]   Tobacco Use    Smoking status: Never    Smokeless tobacco: Never   Substance and Sexual Activity    Alcohol use: Yes     Comment: couple beers a month    Drug use: No    Sexual activity: Yes     Partners: Female   Other Topics Concern    Parent/sibling w/ CABG, MI or angioplasty before 65F 55M? No     Social Drivers of Health     Financial Resource Strain: Low Risk  (3/24/2024)    Financial Resource Strain     " Within the past 12 months, have you or your family members you live with been unable to get utilities (heat, electricity) when it was really needed?: No   Food Insecurity: Low Risk  (3/24/2024)    Food Insecurity     Within the past 12 months, did you worry that your food would run out before you got money to buy more?: No     Within the past 12 months, did the food you bought just not last and you didn t have money to get more?: No   Transportation Needs: Low Risk  (3/24/2024)    Transportation Needs     Within the past 12 months, has lack of transportation kept you from medical appointments, getting your medicines, non-medical meetings or appointments, work, or from getting things that you need?: No   Physical Activity: Sufficiently Active (3/24/2024)    Exercise Vital Sign     Days of Exercise per Week: 7 days     Minutes of Exercise per Session: 40 min   Stress: Stress Concern Present (3/24/2024)    [employer] [employer] - Occupational Stress Questionnaire     Feeling of Stress : To some extent   Social Connections: Unknown (3/24/2024)    Social Connection and Isolation Panel [NHANES]     Frequency of Social Gatherings with Friends and Family: Three times a week   Interpersonal Safety: Low Risk  (3/27/2024)    Interpersonal Safety     Do you feel physically and emotionally safe where you currently live?: Yes     Within the past 12 months, have you been hit, slapped, kicked or otherwise physically hurt by someone?: No     Within the past 12 months, have you been humiliated or emotionally abused in other ways by your partner or ex-partner?: No   Housing Stability: High Risk (3/24/2024)    Housing Stability     Do you have housing? : No     Are you worried about losing your housing?: No     Family History   Problem Relation Age of Onset    Family History Negative Mother     Family History Negative Father     Skin Cancer Father     Family History Negative Paternal Grandfather     Skin Cancer  Brother        ALLERGIES:  No known drug allergy    Current Outpatient Medications   Medication Sig Dispense Refill    hydrOXYzine HCl (ATARAX) 25 MG tablet Take 1 tablet (25 mg) by mouth 3 times daily as needed for anxiety. 90 tablet 0    zolpidem (AMBIEN) 10 MG tablet Take 0.5-1 tablets (5-10 mg) by mouth nightly as needed for sleep 20 tablet 0     No current facility-administered medications for this visit.         ROS:  ROS is done and is negative for general/constitutional, eye, ENT, Respiratory, cardiovascular, GI, , Skin, musculoskeletal except as noted elsewhere.  All other review of systems negative except as noted elsewhere.      OBJECTIVE:    No vital signs obtained as is virtual visit    GENERAL: alert and no distress  EYES: Eyes grossly normal to inspection.  No discharge or erythema, or obvious scleral/conjunctival abnormalities.  RESP: No audible wheeze, cough, or visible cyanosis.    SKIN: Visible skin clear. No significant rash, abnormal pigmentation or lesions.  NEURO: Cranial nerves grossly intact.  Mentation and speech appropriate for age.  PSYCH: Appropriate affect, tone, and pace of words         ASSESSMENT/PLAN:    ASSESSMENT / PLAN:  (R05.1) Acute cough  (primary encounter diagnosis)  Comment: may be prolonged viral, but suspect atypicals. So will tx with zmax  Plan: azithromycin (ZITHROMAX) 250 MG tablet        Reviewed medication instructions and side effects. Follow up if experiences side effects.. I reviewed supportive care, otc meds to use if needed, expected course, and signs of concern.  Follow up for in person visit if does not improve within 1 week(s) or if worsens in any way.  Reviewed red flag symptoms and is to go to the ER if experiences any of these.        See [facility] for orders, medications, letters, patient instructions    Magda Delgadillo MD  1/11/2025, 1:56 PM    Video-Visit Details    Video Start Time:  1:55    Type of service:  Video Visit    Video End Time:2:09  PM    Originating Location (pt. Location): Home    Distant Location (provider location):  [facility]     Platform used for Video Visit: Cortez

## 2025-01-28 ENCOUNTER — VIRTUAL VISIT (OUTPATIENT)
Dept: URGENT CARE | Facility: CLINIC | Age: 40
End: 2025-01-28
Payer: COMMERCIAL

## 2025-01-28 DIAGNOSIS — J01.90 ACUTE SINUSITIS WITH SYMPTOMS > 10 DAYS: Primary | ICD-10-CM

## 2025-01-28 PROCEDURE — 98005 SYNCH AUDIO-VIDEO EST LOW 20: CPT

## 2025-01-28 RX ORDER — PREDNISONE 20 MG/1
20 TABLET ORAL DAILY
Qty: 5 TABLET | Refills: 0 | Status: SHIPPED | OUTPATIENT
Start: 2025-01-28

## 2025-01-28 RX ORDER — AMOXICILLIN 875 MG/1
875 TABLET, COATED ORAL 2 TIMES DAILY
Qty: 20 TABLET | Refills: 0 | Status: SHIPPED | OUTPATIENT
Start: 2025-01-28

## 2025-01-28 NOTE — PROGRESS NOTES
"Gerardo is a 39 year old who is being evaluated via a billable video visit.    How would you like to obtain your AVS? MyChart  If the video visit is dropped, the invitation should be resent by: Text to cell phone: [phone]  Will anyone else be joining your video visit? No      Assessment & Plan   Problem List Items Addressed This Visit    None  Visit Diagnoses       Acute sinusitis with symptoms > 10 days    -  Primary    Relevant Medications    amoxicillin (AMOXIL) 875 MG tablet    predniSONE (DELTASONE) 20 MG tablet         Symptoms started over the holidays and was given azithromycin symptoms improved and now have returned.  Patient reports sinus pressure, red face with stress and two weeks of ear pressure and has now started a mayda pot.  He has been using the steam room as well.   Recommend flonase, mayda pot, start amox and prednisone.          BMI  Estimated body mass index is 26.99 kg/m  as calculated from the following:    Height as of 1/17/25: 1.748 m (5' 8.8\").    Weight as of 1/17/25: 82.4 kg (181 lb 11.2 oz).           No follow-ups on file.      Subjective   Gerardo is a 39 year old, presenting for the following health issues:  No chief complaint on file.        1/17/2025    10:53 AM   Additional Questions   Roomed by Elizabeth PASCUAL LPN     HPI             Review of Systems  Constitutional, HEENT, cardiovascular, pulmonary, GI, , musculoskeletal, neuro, skin, endocrine and psych systems are negative, except as otherwise noted.      Objective           Vitals:  No vitals were obtained today due to virtual visit.    Physical Exam   GENERAL: alert and no distress  EYES: Eyes grossly normal to inspection.  No discharge or erythema, or obvious scleral/conjunctival abnormalities.  RESP: No audible wheeze, cough, or visible cyanosis.    SKIN: Visible skin clear. No significant rash, abnormal pigmentation or lesions.  NEURO: Cranial nerves grossly intact.  Mentation and speech appropriate for age.  PSYCH: " Appropriate affect, tone, and pace of words          Video-Visit Details    Type of service:  Video Visit   Originating Location (pt. Location): Home    Distant Location (provider location):  Off-site  Platform used for Video Visit: Cortez  Signed Electronically by: [facility]

## 2025-01-31 ENCOUNTER — MYC MEDICAL ADVICE (OUTPATIENT)
Dept: FAMILY MEDICINE | Facility: CLINIC | Age: 40
End: 2025-01-31
Payer: COMMERCIAL

## 2025-02-03 NOTE — TELEPHONE ENCOUNTER
Janice ( 1/28 virtual visit) & Dr. Woods--- please review [facility] message and advise.    Farooq Salazar, MICHELLEN, PHN, RN-[facility]

## 2025-02-04 NOTE — TELEPHONE ENCOUNTER
Writer relayed pcp's message to pt via Linkage.    Farooq Salazar, BSN, PHN, RN-[facility]

## 2025-02-05 NOTE — TELEPHONE ENCOUNTER
Writer responded via Arledia.  MICHELLE BarN, RN-BC  [facility]

## 2025-02-05 NOTE — TELEPHONE ENCOUNTER
Dr. Woods--- could you give a recommendation on pt's MyChart message?    Farooq Salazar, BSN, PHN, RN-[facility]

## 2025-03-11 ENCOUNTER — OFFICE VISIT (OUTPATIENT)
Dept: FAMILY MEDICINE | Facility: CLINIC | Age: 40
End: 2025-03-11
Payer: COMMERCIAL

## 2025-03-11 VITALS
DIASTOLIC BLOOD PRESSURE: 86 MMHG | TEMPERATURE: 97.5 F | HEIGHT: 69 IN | WEIGHT: 182 LBS | HEART RATE: 79 BPM | OXYGEN SATURATION: 100 % | SYSTOLIC BLOOD PRESSURE: 132 MMHG | BODY MASS INDEX: 26.96 KG/M2 | RESPIRATION RATE: 16 BRPM

## 2025-03-11 DIAGNOSIS — J32.9 SINUSITIS, UNSPECIFIED CHRONICITY, UNSPECIFIED LOCATION: Primary | ICD-10-CM

## 2025-03-11 LAB
BASOPHILS # BLD AUTO: 0 10E3/UL (ref 0–0.2)
BASOPHILS NFR BLD AUTO: 0 %
EOSINOPHIL # BLD AUTO: 0.1 10E3/UL (ref 0–0.7)
EOSINOPHIL NFR BLD AUTO: 1 %
ERYTHROCYTE [DISTWIDTH] IN BLOOD BY AUTOMATED COUNT: 11.8 % (ref 10–15)
HCT VFR BLD AUTO: 43.7 % (ref 40–53)
HGB BLD-MCNC: 14.6 G/DL (ref 13.3–17.7)
IMM GRANULOCYTES # BLD: 0 10E3/UL
IMM GRANULOCYTES NFR BLD: 0 %
LYMPHOCYTES # BLD AUTO: 2 10E3/UL (ref 0.8–5.3)
LYMPHOCYTES NFR BLD AUTO: 20 %
MCH RBC QN AUTO: 30.2 PG (ref 26.5–33)
MCHC RBC AUTO-ENTMCNC: 33.4 G/DL (ref 31.5–36.5)
MCV RBC AUTO: 91 FL (ref 78–100)
MONOCYTES # BLD AUTO: 0.9 10E3/UL (ref 0–1.3)
MONOCYTES NFR BLD AUTO: 9 %
NEUTROPHILS # BLD AUTO: 7 10E3/UL (ref 1.6–8.3)
NEUTROPHILS NFR BLD AUTO: 69 %
PLATELET # BLD AUTO: 341 10E3/UL (ref 150–450)
RBC # BLD AUTO: 4.83 10E6/UL (ref 4.4–5.9)
TSH SERPL DL<=0.005 MIU/L-ACNC: 2.14 UIU/ML (ref 0.3–4.2)
WBC # BLD AUTO: 10.1 10E3/UL (ref 4–11)

## 2025-03-11 PROCEDURE — 99213 OFFICE O/P EST LOW 20 MIN: CPT | Performed by: INTERNAL MEDICINE

## 2025-03-11 PROCEDURE — 3075F SYST BP GE 130 - 139MM HG: CPT | Performed by: INTERNAL MEDICINE

## 2025-03-11 PROCEDURE — 85025 COMPLETE CBC W/AUTO DIFF WBC: CPT | Performed by: INTERNAL MEDICINE

## 2025-03-11 PROCEDURE — 36415 COLL VENOUS BLD VENIPUNCTURE: CPT | Performed by: INTERNAL MEDICINE

## 2025-03-11 PROCEDURE — 3079F DIAST BP 80-89 MM HG: CPT | Performed by: INTERNAL MEDICINE

## 2025-03-11 PROCEDURE — 84443 ASSAY THYROID STIM HORMONE: CPT | Performed by: INTERNAL MEDICINE

## 2025-03-11 NOTE — PROGRESS NOTES
"  Assessment & Plan     Sinusitis, unspecified chronicity, unspecified location  He does note gradual improvement.  He was already treated with amoxicillin.  We will check labs as ordered.  Otherwise we are offering reassurance.  He is using Flonase right now.  I suggest doubling the Flonase for 3 days leading up to his long-haul flights.  Patient is in agreement.  - CBC with platelets and differential  - TSH with free T4 reflex  - CBC with platelets and differential  - TSH with free T4 reflex        BMI  Estimated body mass index is 27.03 kg/m  as calculated from the following:    Height as of this encounter: 1.748 m (5' 8.8\").    Weight as of this encounter: 82.6 kg (182 lb).         Keesha Carrillo is a 40 year old, presenting for the following health issues:  Sinus Problem    Pleasant patient is new to me but not the system.    Usually goes to a clinic in [facility] but works around here.     He states had post holiday viral URI sx.  Subsequently developed a sinus infection. Was given Amox a month ago.  Also has bene using Netti Pot.      He has lingering facial flushing and redness around the temples that is intermittent.  Worse in the evenings and potentially with alcohol intake.    He is otherwise feeling well.  He mentions he had a personal record on his 1 mile run this morning.  He is interested in getting evaluated as he will be traveling for work and partial pleasure to [place] and [place] in the near future.    History of Present Illness       Reason for visit:  Ongonig sinus infection and facial flushing (in evenings)    He eats 4 or more servings of fruits and vegetables daily.He consumes 1 sweetened beverage(s) daily.He exercises with enough effort to increase his heart rate 30 to 60 minutes per day.  He exercises with enough effort to increase his heart rate 6 days per week.   He is taking medications regularly.              Objective    /86 (BP Location: Right arm, Patient Position: Sitting, " "Cuff Size: Adult Large)   Pulse 79   Temp 97.5  F (36.4  C) (Oral)   Resp 16   Ht 1.748 m (5' 8.8\")   Wt 82.6 kg (182 lb)   SpO2 100%   BMI 27.03 kg/m    Body mass index is 27.03 kg/m .  Physical Exam   GENERAL: alert and no distress  NECK: no adenopathy, no asymmetry, masses, or scars  RESP: lungs clear to auscultation - no rales, rhonchi or wheezes  CV: regular rate and rhythm, normal S1 S2, no S3 or S4, no murmur, click or rub, no peripheral edema  ABDOMEN: soft, nontender, no hepatosplenomegaly, no masses and bowel sounds normal  MS: no gross musculoskeletal defects noted, no edema            Signed Electronically by: Jeffrey Chapman MD    "

## 2025-04-04 SDOH — HEALTH STABILITY: PHYSICAL HEALTH: ON AVERAGE, HOW MANY MINUTES DO YOU ENGAGE IN EXERCISE AT THIS LEVEL?: 40 MIN

## 2025-04-04 SDOH — HEALTH STABILITY: PHYSICAL HEALTH: ON AVERAGE, HOW MANY DAYS PER WEEK DO YOU ENGAGE IN MODERATE TO STRENUOUS EXERCISE (LIKE A BRISK WALK)?: 6 DAYS

## 2025-04-04 ASSESSMENT — SOCIAL DETERMINANTS OF HEALTH (SDOH): HOW OFTEN DO YOU GET TOGETHER WITH FRIENDS OR RELATIVES?: TWICE A WEEK

## 2025-04-07 ENCOUNTER — OFFICE VISIT (OUTPATIENT)
Dept: FAMILY MEDICINE | Facility: CLINIC | Age: 40
End: 2025-04-07
Attending: FAMILY MEDICINE
Payer: COMMERCIAL

## 2025-04-07 VITALS
TEMPERATURE: 97.6 F | OXYGEN SATURATION: 99 % | WEIGHT: 185.9 LBS | RESPIRATION RATE: 18 BRPM | HEART RATE: 73 BPM | SYSTOLIC BLOOD PRESSURE: 136 MMHG | HEIGHT: 69 IN | BODY MASS INDEX: 27.53 KG/M2 | DIASTOLIC BLOOD PRESSURE: 80 MMHG

## 2025-04-07 DIAGNOSIS — Z00.00 ROUTINE GENERAL MEDICAL EXAMINATION AT A HEALTH CARE FACILITY: Primary | ICD-10-CM

## 2025-04-07 DIAGNOSIS — G47.25 CIRCADIAN RHYTHM SLEEP DISORDER, JET LAG TYPE: ICD-10-CM

## 2025-04-07 DIAGNOSIS — Z13.1 SCREENING FOR DIABETES MELLITUS: ICD-10-CM

## 2025-04-07 DIAGNOSIS — F41.9 ANXIETY: ICD-10-CM

## 2025-04-07 DIAGNOSIS — J32.9 CHRONIC SINUSITIS, UNSPECIFIED LOCATION: ICD-10-CM

## 2025-04-07 DIAGNOSIS — Z13.220 SCREENING FOR HYPERLIPIDEMIA: ICD-10-CM

## 2025-04-07 LAB
CHOLEST SERPL-MCNC: 238 MG/DL
FASTING STATUS PATIENT QL REPORTED: YES
FASTING STATUS PATIENT QL REPORTED: YES
GLUCOSE SERPL-MCNC: 89 MG/DL (ref 70–99)
HDLC SERPL-MCNC: 40 MG/DL
LDLC SERPL CALC-MCNC: 175 MG/DL
NONHDLC SERPL-MCNC: 198 MG/DL
TRIGL SERPL-MCNC: 117 MG/DL

## 2025-04-07 PROCEDURE — 3075F SYST BP GE 130 - 139MM HG: CPT | Performed by: FAMILY MEDICINE

## 2025-04-07 PROCEDURE — 1126F AMNT PAIN NOTED NONE PRSNT: CPT | Performed by: FAMILY MEDICINE

## 2025-04-07 PROCEDURE — 80061 LIPID PANEL: CPT | Performed by: FAMILY MEDICINE

## 2025-04-07 PROCEDURE — 99396 PREV VISIT EST AGE 40-64: CPT | Performed by: FAMILY MEDICINE

## 2025-04-07 PROCEDURE — 36415 COLL VENOUS BLD VENIPUNCTURE: CPT | Performed by: FAMILY MEDICINE

## 2025-04-07 PROCEDURE — 3079F DIAST BP 80-89 MM HG: CPT | Performed by: FAMILY MEDICINE

## 2025-04-07 PROCEDURE — 82947 ASSAY GLUCOSE BLOOD QUANT: CPT | Performed by: FAMILY MEDICINE

## 2025-04-07 RX ORDER — HYDROXYZINE HYDROCHLORIDE 25 MG/1
25 TABLET, FILM COATED ORAL 3 TIMES DAILY PRN
Qty: 90 TABLET | Refills: 0 | Status: SHIPPED | OUTPATIENT
Start: 2025-04-07

## 2025-04-07 RX ORDER — ZOLPIDEM TARTRATE 10 MG/1
5-10 TABLET ORAL
Qty: 20 TABLET | Refills: 0 | Status: SHIPPED | OUTPATIENT
Start: 2025-04-07

## 2025-04-07 ASSESSMENT — PAIN SCALES - GENERAL: PAINLEVEL_OUTOF10: NO PAIN (0)

## 2025-04-07 NOTE — PROGRESS NOTES
"Preventive Care Visit  [facility] Ernesto Woods MD, MD, Family Medicine  Apr 7, 2025      Assessment & Plan     Routine general medical examination at a health care facility       Chronic sinusitis, unspecified location  Persistent sinusitis symptoms.  We discussed talking to an ENT and he agreed.  - Adult ENT  Referral; Future    Anxiety  Some baseline anxiety.  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  Hydroxyzine as needed for anxiety and events traveling for long flights uses zolpidem for jet lag.  - hydrOXYzine HCl (ATARAX) 25 MG tablet; Take 1 tablet (25 mg) by mouth 3 times daily as needed for anxiety.    Circadian rhythm sleep disorder, jet lag type  See above.  Understands controlled substance.  Infrequent use.  - zolpidem (AMBIEN) 10 MG tablet; Take 0.5-1 tablets (5-10 mg) by mouth nightly as needed for sleep.    Screening for diabetes mellitus     - Glucose; Future  - Glucose    Screening for hyperlipidemia     - Lipid panel reflex to direct LDL Fasting; Future  - Lipid panel reflex to direct LDL Fasting            BMI  Estimated body mass index is 27.65 kg/m  as calculated from the following:    Height as of this encounter: 1.746 m (5' 8.75\").    Weight as of this encounter: 84.3 kg (185 lb 14.4 oz).       Counseling  Appropriate preventive services were addressed with this patient via screening, questionnaire, or discussion as appropriate for fall prevention, nutrition, physical activity, Tobacco-use cessation, social engagement, weight loss and cognition.  Checklist reviewing preventive services available has been given to the patient.  Reviewed patient's diet, addressing concerns and/or questions.   He is at risk for psychosocial distress and has been provided with information to reduce risk.           Keesha Carrillo is a 40 year old, presenting for the following:  Physical        4/7/2025     8:20 AM   Additional Questions   Roomed by Floridalma" C        HPI    Got  last year.   Had sinus infection that is going on for months.   Was in [place] for 2 weeks - all symptoms were gone. Started allergy med and helped somewhat.   Overall feeling fine on day to day basis.   Hydroxyzine as needed.   Using ambien only for long flights due to jeg lag.     Advance Care Planning  Patient does not have a Health Care Directive: Patient states has Advance Directive and will bring in a copy to clinic.      4/4/2025   General Health   How would you rate your overall physical health? Good   Feel stress (tense, anxious, or unable to sleep) To some extent   (!) STRESS CONCERN      4/4/2025   Nutrition   Three or more servings of calcium each day? Yes   Diet: Regular (no restrictions)   How many servings of fruit and vegetables per day? (!) 2-3   How many sweetened beverages each day? 0-1         4/4/2025   Exercise   Days per week of moderate/strenous exercise 6 days   Average minutes spent exercising at this level 40 min         4/4/2025   Social Factors   Frequency of gathering with friends or relatives Twice a week   Worry food won't last until get money to buy more No   Food not last or not have enough money for food? No   Do you have housing? (Housing is defined as stable permanent housing and does not include staying ouside in a car, in a tent, in an abandoned building, in an overnight shelter, or couch-surfing.) Yes   Are you worried about losing your housing? No   Lack of transportation? No   Unable to get utilities (heat,electricity)? No         4/4/2025   Dental   Dentist two times every year? Yes         3/24/2024   TB Screening   Were you born outside of the [place]? No     Today's PHQ-2 Score:       4/7/2025     8:15 AM   PHQ-2 ( 1999 [employer])   Q1: Little interest or pleasure in doing things 0   Q2: Feeling down, depressed or hopeless 0   PHQ-2 Score 0    Q1: Little interest or pleasure in doing things Not at all   Q2: Feeling down, depressed or hopeless  "Not at all   PHQ-2 Score 0       Patient-reported         4/4/2025   Substance Use   Alcohol more than 3/day or more than 7/wk No   Do you use any other substances recreationally? (!) CANNABIS PRODUCTS     Social History     Tobacco Use    Smoking status: Never     Passive exposure: Never    Smokeless tobacco: Never   Vaping Use    Vaping status: Never Used   Substance Use Topics    Alcohol use: Yes     Comment: couple beers a month    Drug use: No           4/4/2025   STI Screening   New sexual partner(s) since last STI/HIV test? No   ASCVD Risk   The 10-year ASCVD risk score (Shannan SANTOS, et al., 2019) is: 1.6%    Values used to calculate the score:      Age: 40 years      Sex: Male      Is Non- : No      Diabetic: No      Tobacco smoker: No      Systolic Blood Pressure: 136 mmHg      Is BP treated: No      HDL Cholesterol: 44 mg/dL      Total Cholesterol: 210 mg/dL        4/4/2025   Contraception/Family Planning   Questions about contraception or family planning (!) YES  - thinking to have a child.         Reviewed and updated as needed this visit by Provider                       Objective    Exam  /80 (BP Location: Right arm, Patient Position: Sitting, Cuff Size: Adult Regular)   Pulse 73   Temp 97.6  F (36.4  C) (Temporal)   Resp 18   Ht 1.746 m (5' 8.75\")   Wt 84.3 kg (185 lb 14.4 oz)   SpO2 99%   BMI 27.65 kg/m     Estimated body mass index is 27.65 kg/m  as calculated from the following:    Height as of this encounter: 1.746 m (5' 8.75\").    Weight as of this encounter: 84.3 kg (185 lb 14.4 oz).    Physical Exam  GENERAL: alert and no distress  EYES: Eyes grossly normal to inspection, PERRL and conjunctivae and sclerae normal  HENT: ear canals and TM's normal, nose and mouth without ulcers or lesions  NECK: no adenopathy, no asymmetry, masses, or scars  RESP: lungs clear to auscultation - no rales, rhonchi or wheezes  CV: regular rate and rhythm, normal S1 S2, no " S3 or S4, no murmur, click or rub, no peripheral edema  ABDOMEN: soft, nontender, no hepatosplenomegaly, no masses and bowel sounds normal   (male): normal male genitalia without lesions or urethral discharge, no hernia  MS: no gross musculoskeletal defects noted, no edema  SKIN: no suspicious lesions or rashes  NEURO: Normal strength and tone, mentation intact and speech normal  PSYCH: mentation appears normal, affect normal/bright        Signed Electronically by: Ramírez Woods MD, MD

## 2025-04-07 NOTE — PATIENT INSTRUCTIONS
Patient Education   Preventive Care Advice   This is general advice given by our system to help you stay healthy. However, your care team may have specific advice just for you. Please talk to your care team about your preventive care needs.  Nutrition  Eat 5 or more servings of fruits and vegetables each day.  Try wheat bread, brown rice and whole grain pasta (instead of white bread, rice, and pasta).  Get enough calcium and vitamin D. Check the label on foods and aim for 100% of the RDA (recommended daily allowance).  Lifestyle  Exercise at least 150 minutes each week  (30 minutes a day, 5 days a week).  Do muscle strengthening activities 2 days a week. These help control your weight and prevent disease.  No smoking.  Wear sunscreen to prevent skin cancer.  Have a dental exam and cleaning every 6 months.  Yearly exams  See your health care team every year to talk about:  Any changes in your health.  Any medicines your care team has prescribed.  Preventive care, family planning, and ways to prevent chronic diseases.  Shots (vaccines)   HPV shots (up to age 26), if you've never had them before.  Hepatitis B shots (up to age 59), if you've never had them before.  COVID-19 shot: Get this shot when it's due.  Flu shot: Get a flu shot every year.  Tetanus shot: Get a tetanus shot every 10 years.  Pneumococcal, hepatitis A, and RSV shots: Ask your care team if you need these based on your risk.  Shingles shot (for age 50 and up)  General health tests  Diabetes screening:  Starting at age 35, Get screened for diabetes at least every 3 years.  If you are younger than age 35, ask your care team if you should be screened for diabetes.  Cholesterol test: At age 39, start having a cholesterol test every 5 years, or more often if advised.  Bone density scan (DEXA): At age 50, ask your care team if you should have this scan for osteoporosis (brittle bones).  Hepatitis C: Get tested at least once in your life.  STIs (sexually  transmitted infections)  Before age 24: Ask your care team if you should be screened for STIs.  After age 24: Get screened for STIs if you're at risk. You are at risk for STIs (including HIV) if:  You are sexually active with more than one person.  You don't use condoms every time.  You or a partner was diagnosed with a sexually transmitted infection.  If you are at risk for HIV, ask about PrEP medicine to prevent HIV.  Get tested for HIV at least once in your life, whether you are at risk for HIV or not.  Cancer screening tests  Cervical cancer screening: If you have a cervix, begin getting regular cervical cancer screening tests starting at age 21.  Breast cancer scan (mammogram): If you've ever had breasts, begin having regular mammograms starting at age 40. This is a scan to check for breast cancer.  Colon cancer screening: It is important to start screening for colon cancer at age 45.  Have a colonoscopy test every 10 years (or more often if you're at risk) Or, ask your provider about stool tests like a FIT test every year or Cologuard test every 3 years.  To learn more about your testing options, visit:   .  For help making a decision, visit:   https://bit.ly/ah47701.  Prostate cancer screening test: If you have a prostate, ask your care team if a prostate cancer screening test (PSA) at age 55 is right for you.  Lung cancer screening: If you are a current or former smoker ages 50 to 80, ask your care team if ongoing lung cancer screenings are right for you.  For informational purposes only. Not to replace the advice of your health care provider. Copyright   2023 [facility]. All rights reserved. Clinically reviewed by the [facility] Transitions [facility]. [employer] 844309 - REV 01/24.  Learning About Stress  What is stress?     Stress is your body's response to a hard situation. Your body can have a physical, emotional, or mental response. Stress is a fact of life for most people, and it  affects everyone differently. What causes stress for you may not be stressful for someone else.  A lot of things can cause stress. You may feel stress when you go on a job interview, take a test, or run a race. This kind of short-term stress is normal and even useful. It can help you if you need to work hard or react quickly. For example, stress can help you finish an important job on time.  Long-term stress is caused by ongoing stressful situations or events. Examples of long-term stress include long-term health problems, ongoing problems at work, or conflicts in your family. Long-term stress can harm your health.  How does stress affect your health?  When you are stressed, your body responds as though you are in danger. It makes hormones that speed up your heart, make you breathe faster, and give you a burst of energy. This is called the fight-or-flight stress response. If the stress is over quickly, your body goes back to normal and no harm is done.  But if stress happens too often or lasts too long, it can have bad effects. Long-term stress can make you more likely to get sick, and it can make symptoms of some diseases worse. If you tense up when you are stressed, you may develop neck, shoulder, or low back pain. Stress is linked to high blood pressure and heart disease.  Stress also harms your emotional health. It can make you amezcua, tense, or depressed. Your relationships may suffer, and you may not do well at work or school.  What can you do to manage stress?  You can try these things to help manage stress:   Do something active. Exercise or activity can help reduce stress. Walking is a great way to get started. Even everyday activities such as housecleaning or yard work can help.  Try yoga or fawn chi. These techniques combine exercise and meditation. You may need some training at first to learn them.  Do something you enjoy. For example, listen to music or go to a movie. Practice your hobby or do volunteer  "work.  Meditate. This can help you relax, because you are not worrying about what happened before or what may happen in the future.  Do guided imagery. Imagine yourself in any setting that helps you feel calm. You can use online videos, books, or a teacher to guide you.  Do breathing exercises. For example:  From a standing position, bend forward from the waist with your knees slightly bent. Let your arms dangle close to the floor.  Breathe in slowly and deeply as you return to a standing position. Roll up slowly and lift your head last.  Hold your breath for just a few seconds in the standing position.  Breathe out slowly and bend forward from the waist.  Let your feelings out. Talk, laugh, cry, and express anger when you need to. Talking with supportive friends or family, a counselor, or a elvin leader about your feelings is a healthy way to relieve stress. Avoid discussing your feelings with people who make you feel worse.  Write. It may help to write about things that are bothering you. This helps you find out how much stress you feel and what is causing it. When you know this, you can find better ways to cope.  What can you do to prevent stress?  You might try some of these things to help prevent stress:  Manage your time. This helps you find time to do the things you want and need to do.  Get enough sleep. Your body recovers from the stresses of the day while you are sleeping.  Get support. Your family, friends, and community can make a difference in how you experience stress.  Limit your news feed. Avoid or limit time on social media or news that may make you feel stressed.  Do something active. Exercise or activity can help reduce stress. Walking is a great way to get started.  Where can you learn more?  Go to https://www.bMenu.net/patiented  Enter N032 in the search box to learn more about \"Learning About Stress.\"  Current as of: October 24, 2024  Content Version: 14.4 2024-2025 [employer].   Care instructions adapted under license by your healthcare professional. If you have questions about a medical condition or this instruction, always ask your healthcare professional. [employer] disclaims any warranty or liability for your use of this information.    Substance Use Disorder: Care Instructions  Overview     You can improve your life and health by stopping your use of alcohol or drugs. When you don't drink or use drugs, you may feel and sleep better. You may get along better with your family, friends, and coworkers. There are medicines and programs that can help with substance use disorder.  How can you care for yourself at home?  Here are some ways to help you stay sober and prevent relapse.  If you have been given medicine to help keep you sober or reduce your cravings, be sure to take it exactly as prescribed.  Talk to your doctor about programs that can help you stop using drugs or drinking alcohol.  Do not keep alcohol or drugs in your home.  Plan ahead. Think about what you'll say if other people ask you to drink or use drugs. Try not to spend time with people who drink or use drugs.  Use the time and money spent on drinking or drugs to do something that's important to you.  Preventing a relapse  Have a plan to deal with relapse. Learn to recognize changes in your thinking that lead you to drink or use drugs. Get help before you start to drink or use drugs again.  Try to stay away from situations, friends, or places that may lead you to drink or use drugs.  If you feel the need to drink alcohol or use drugs again, seek help right away. Call a trusted friend or family member. Some people get support from organizations such as Narcotics Anonymous or [employer] or from treatment facilities.  If you relapse, get help as soon as you can. Some people make a plan with another person that outlines what they want that person to do for them if they relapse. The plan usually includes  how to handle the relapse and who to notify in case of relapse.  Don't give up. Remember that a relapse doesn't mean that you have failed. Use the experience to learn the triggers that lead you to drink or use drugs. Then quit again. Recovery is a lifelong process. Many people have several relapses before they are able to quit for good.  Follow-up care is a key part of your treatment and safety. Be sure to make and go to all appointments, and call your doctor if you are having problems. It's also a good idea to know your test results and keep a list of the medicines you take.  When should you call for help?   Call 911  anytime you think you may need emergency care. For example, call if you or someone else:    Has overdosed or has withdrawal signs. Be sure to tell the emergency workers that you are or someone else is using or trying to quit using drugs. Overdose or withdrawal signs may include:  Losing consciousness.  Seizure.  Seeing or hearing things that aren't there (hallucinations).     Is thinking or talking about suicide or harming others.   Where to get help 24 hours a day, 7 days a week   If you or someone you know talks about suicide, self-harm, a mental health crisis, a substance use crisis, or any other kind of emotional distress, get help right away. You can:    Call the Suicide and Crisis Lifeline at 773.     Call 1-377-681-TALK (1-215.156.3211).     Text HOME to 264342 to access the Crisis Text Line.   Consider saving these numbers in your phone.  Go to [email or web address] for more information or to chat online.  Call your doctor now or seek immediate medical care if:    You are having withdrawal symptoms. These may include nausea or vomiting, sweating, shakiness, and anxiety.   Watch closely for changes in your health, and be sure to contact your doctor if:    You have a relapse.     You need more help or support to stop.   Where can you learn more?  Go to https://www.healthwise.net/patiented  Enter H573  "in the search box to learn more about \"Substance Use Disorder: Care Instructions.\"  Current as of: August 20, 2024  Content Version: 14.4 2024-2025 [employer].   Care instructions adapted under license by your healthcare professional. If you have questions about a medical condition or this instruction, always ask your healthcare professional. [employer] disclaims any warranty or liability for your use of this information.       "

## 2025-04-08 NOTE — RESULT ENCOUNTER NOTE
Unfortunately bad cholesterol LDL is keep getting worse and it is the highest you have had it in last many years.  Your bad cholesterol (LDL) is high and puts you at higher risk for stroke and heart attacks. Active weight loss if you are overweight, regular cardio type exercise, cutting down on sugar, fat, carbohydrates in diet and adding more fruits and vegetable would improve your cholesterol. Please quit smoking if you are a smoker.    You may need to consider cholesterol-lowering medication if your bad cholesterol is keeps rising up.    Diabetes test is within normal limits.    Ramírez Woods MD  [facility]

## 2025-04-10 NOTE — TELEPHONE ENCOUNTER
REFERRAL INFORMATION:  Referring By: Ramírez Woods MD  Referring Clinic: [facility]   Reason for Visit/Diagnosis: Chronic sinusitis, unspecified location, ref'd by Ramírez Woods MD, [place] [facility] [place]    FUTURE VISIT INFORMATION:  Appointment Date: 5/28/25  Appointment Time: 9:10 AM    NOTES STATUS DETAILS   OFFICE NOTE from referring provider Internal  4/7/25-  OV w.  Ramírez Woods MD @ [facility]    OFFICE NOTE from other specialist Internal  3/11/25-  OV latrell. Jeffrey Chapman MD @ [facility]     1/28/25-  OV latrell. Janice Mazariegos NP @ [facility]

## 2025-05-19 DIAGNOSIS — F41.9 ANXIETY: ICD-10-CM

## 2025-05-19 RX ORDER — HYDROXYZINE HYDROCHLORIDE 25 MG/1
25 TABLET, FILM COATED ORAL 3 TIMES DAILY PRN
Qty: 90 TABLET | Refills: 0 | Status: SHIPPED | OUTPATIENT
Start: 2025-05-19

## 2025-05-27 NOTE — PROGRESS NOTES
[facility]  New Patient Visit      Encounter date:   May 28, 2025    Referring Provider:   Ramírez Woods MD  [address]  [place] [address]    Chief Complaint: Consult     History of Present Illness:   Vazquez Rodriguez is a 40 year old male who presents for consultation regarding chronic sinusitis.    3/11/25-  OV Jeffrey Santiago MD @ [facility]: Patient was seen in office for sinusitis. Regarding this, He does note gradual improvement.  He was already treated with amoxicillin.  We will check labs as ordered.  Otherwise we are offering reassurance.  He is using Flonase right now.  I suggest doubling the Flonase for 3 days leading up to his long-haul flights.  Patient is in agreement.    4/7/25-  OV Ramírez Coleman MD @ [facility]: Patient was seen in office for routine health exam. At this time, chronic sinusitis was reported. Regarding this, Persistent sinusitis symptoms.  We discussed talking to an ENT and he agreed.    5/28/25: Today, the patient reports his symptoms started in late December/early January. He didn't address it, but felt it may have been Covid. He reported a red face, headache, and brain fog. When his symptoms were in their peak, he reported no drainage which is unusual for him and a sensation of fullness/being flushed. He went to [place], where his symptoms resolved. Once he returned home and back to work, his symptoms returned but not as pronounced. The facial redness returned as well, especially when he is stressed. He did a lot of sinus rinses back in the winter, as well as Flonase and Allegra. He hasn't done sinus rinses since winter, because his symptoms have decreased. He notices that when he works out, his symptoms improve. He denies a history of allergies. He states that his symptoms aren't overly bothersome right now, he just notices they are there. He endorses congestion but it is not persistent.     Of  note, he travels a lot with work also has noticed increased stress      Review of systems: A 14-point review of systems has been conducted and was negative for any notable symptoms, except as dictated in the history of present illness.     Medical History:  Past Medical History:   Diagnosis Date    NO ACTIVE PROBLEMS         Surgical History:   Past Surgical History:   Procedure Laterality Date    HC TOOTH EXTRACTION W/FORCEP  03/2004    wisdom teeth        Family History:  Family History   Problem Relation Age of Onset    Family History Negative Mother     Family History Negative Father     Skin Cancer Father     Family History Negative Paternal Grandfather     Skin Cancer Brother         Social History:   Social History     Socioeconomic History    Marital status: Single   Occupational History    Occupation: student     Employer: STUDENT     Comment: [employer] [place]   Tobacco Use    Smoking status: Never     Passive exposure: Never    Smokeless tobacco: Never   Vaping Use    Vaping status: Never Used   Substance and Sexual Activity    Alcohol use: Yes     Comment: couple beers a month    Drug use: No    Sexual activity: Yes     Partners: Female   Other Topics Concern    Parent/sibling w/ CABG, MI or angioplasty before 65F 55M? No     Social Drivers of Health     Financial Resource Strain: Low Risk  (4/4/2025)    Financial Resource Strain     Within the past 12 months, have you or your family members you live with been unable to get utilities (heat, electricity) when it was really needed?: No   Food Insecurity: Low Risk  (4/4/2025)    Food Insecurity     Within the past 12 months, did you worry that your food would run out before you got money to buy more?: No     Within the past 12 months, did the food you bought just not last and you didn t have money to get more?: No   Transportation Needs: Low Risk  (4/4/2025)    Transportation Needs     Within the past 12 months, has lack of transportation kept you  "from medical appointments, getting your medicines, non-medical meetings or appointments, work, or from getting things that you need?: No   Physical Activity: Sufficiently Active (4/4/2025)    Exercise Vital Sign     Days of Exercise per Week: 6 days     Minutes of Exercise per Session: 40 min   Stress: Stress Concern Present (4/4/2025)    [employer] - Occupational Stress Questionnaire     Feeling of Stress : To some extent   Social Connections: Unknown (4/4/2025)    Social Connection and Isolation Panel [NHANES]     Frequency of Social Gatherings with Friends and Family: Twice a week   Interpersonal Safety: Low Risk  (4/7/2025)    Interpersonal Safety     Do you feel physically and emotionally safe where you currently live?: Yes     Within the past 12 months, have you been hit, slapped, kicked or otherwise physically hurt by someone?: No     Within the past 12 months, have you been humiliated or emotionally abused in other ways by your partner or ex-partner?: No   Housing Stability: Low Risk  (4/4/2025)    Housing Stability     Do you have housing? : Yes     Are you worried about losing your housing?: No        Physical Exam:  Vital signs: BP (!) 147/96   Pulse 83   Ht 1.746 m (5' 8.75\")   Wt 85 kg (187 lb 8 oz)   SpO2 99%   BMI 27.89 kg/m     General Appearance: No acute distress, appropriate demeanor, conversant  Eyes: moist conjunctivae; EOMI; pupils symmetric; visual acuity grossly intact; no proptosis  Head: normocephalic; overall symmetric appearance without deformity  Face: overall symmetric without deformity  Ears: Normal appearance of external ear; external meatus normal in appearance  Nose: No external deformity  Oral Cavity/oropharynx: Normal appearance of mucosa  Neck: no palpable lymphadenopathy; thyroid without palpable nodules  Lungs: symmetric chest rise; no wheezing  CV: Good distal perfusion; normal hear rate  Extremities: No deformity  Neurologic Exam: Cranial " nerves II-XII are grossly intact; no focal deficit    Procedure Note  Procedure performed: Rigid nasal endoscopy  Indication: To evaluate for sinonasal pathology not visualized on routine anterior rhinoscopy  Anesthesia: 4% topical lidocaine with 0.05% oxymetazoline  Description of procedure: After obtaining consent for the procedure from the patient, the sinonasal cavity was sprayed with topical anesthetic. A rigid 30-degree nasal endoscope was used to first used to visualize the nasal cavity, the turbinates, and the nasopharynx on the left. A second pass was then made to visualize the middle meatus, the superior meatus and sphenoethmoid recess. Finally, the scope was turned 90-degrees and used to visualize the olfactory cleft and frontal outflow tract. A similar approach was used for the contralateral side. They tolerated the procedure well without difficulty.    Ciarra-Rojas Endoscopic Scoring System  Endoscopic observation Right Left   Polyps in middle meatus (0 = absent, 1 = restricted to middle meatus, 2 = Beyond middle meatus) 0 0   Discharge (0 = absent, 1 = thin and clear, 2 = thick, purulent) 0 0   Edema (0 = absent, 1 = mild-moderate, 2 = moderate-severe) 0 0   Crusting (0 = absent, 1 = mild-moderate, 2 = moderate-severe) 0 0   Scarring (0= absent, 1 = mild-moderate, 2 = moderate-severe) 0 0   Total 0 0     Findings  RT/LT: Bilateral sinonasal passages patent without evidence of infection, polyps, or mass.      The patient tolerated the procedure well without complication.     Assessment/Medical Decision Making:  Vazquez Rodriguez is a 40 year old male who presents for consultation regarding possible chronic sinusitis. His symptoms are improved from what they were in the winter. Only real symptom now is facial flushing which I don't think is related to the sinuses. We discussed that we could gett a sinus CT scan though in the setting of improving symptoms could certainly wait. His exam looks good with no  evidence of infection or drainage. I don't note any concerning findings. I suspect his symptoms will continue to improve. After a discussion, the patient opted to wait on the CT scan and see if his symptoms continue to improve. He will reach out if they do not and can do one at that point.     Plan:  Patient will reach out to clinic in one month. Based on that message, a sinus CT scan will be ordered if symptoms are not improved.   Follow up with me as needed.     Scribe Disclosure:   By signing my name below, I, Nidia Galan, attest that this documentation has been prepared under the direction and in the presence of Murray Street MD.  - Electronically Signed: Nidia Galan 05/28/25     Electronically signed by:    Murray Street MD    [facility]  Rhinology  Endoscopic Skull Base Surgery  [facility]  Department of Otolaryngology - Head & Neck Surgery

## 2025-05-28 ENCOUNTER — PRE VISIT (OUTPATIENT)
Dept: OTOLARYNGOLOGY | Facility: CLINIC | Age: 40
End: 2025-05-28

## 2025-05-28 ENCOUNTER — OFFICE VISIT (OUTPATIENT)
Dept: OTOLARYNGOLOGY | Facility: CLINIC | Age: 40
End: 2025-05-28
Payer: COMMERCIAL

## 2025-05-28 VITALS
SYSTOLIC BLOOD PRESSURE: 147 MMHG | BODY MASS INDEX: 27.77 KG/M2 | WEIGHT: 187.5 LBS | DIASTOLIC BLOOD PRESSURE: 96 MMHG | HEIGHT: 69 IN | HEART RATE: 83 BPM | OXYGEN SATURATION: 99 %

## 2025-05-28 DIAGNOSIS — J32.9 CHRONIC SINUSITIS, UNSPECIFIED LOCATION: ICD-10-CM

## 2025-05-28 PROCEDURE — 1126F AMNT PAIN NOTED NONE PRSNT: CPT | Performed by: STUDENT IN AN ORGANIZED HEALTH CARE EDUCATION/TRAINING PROGRAM

## 2025-05-28 PROCEDURE — 3080F DIAST BP >= 90 MM HG: CPT | Performed by: STUDENT IN AN ORGANIZED HEALTH CARE EDUCATION/TRAINING PROGRAM

## 2025-05-28 PROCEDURE — 99243 OFF/OP CNSLTJ NEW/EST LOW 30: CPT | Mod: 25 | Performed by: STUDENT IN AN ORGANIZED HEALTH CARE EDUCATION/TRAINING PROGRAM

## 2025-05-28 PROCEDURE — 3077F SYST BP >= 140 MM HG: CPT | Performed by: STUDENT IN AN ORGANIZED HEALTH CARE EDUCATION/TRAINING PROGRAM

## 2025-05-28 PROCEDURE — 31231 NASAL ENDOSCOPY DX: CPT | Performed by: STUDENT IN AN ORGANIZED HEALTH CARE EDUCATION/TRAINING PROGRAM

## 2025-05-28 ASSESSMENT — PAIN SCALES - GENERAL: PAINLEVEL_OUTOF10: NO PAIN (0)

## 2025-05-28 NOTE — PATIENT INSTRUCTIONS
You were seen in the ENT Clinic today by Dr. Street. If you have any questions or concerns after your appointment, please contact us (see below)       2.   The following recommendations have been made based upon your appointment today:  Please reach out to us in approximately one month and let us know how your symptoms are and we can assess a further treatment plan.             How to Contact Us:  Send a [employer] message to your provider. Our team will respond to you via [employer]. Occasionally, we will need to call you to get further information.  For urgent matters (Monday-Friday), call the ENT Clinic: [phone] and speak with a call center team member - they will route your call appropriately.   If you'd like to speak directly with a nurse, please find our contact information below. We do our best to check voicemail frequently throughout the day, and will work to call you back within 1-2 days. For urgent matters, please use the general clinic phone numbers listed above.     Aurora DIGGS RN  Direct: [phone]  Capri GARNER LPN  Direct: [phone]         [facility]  Department of Otolaryngology

## 2025-05-28 NOTE — LETTER
5/28/2025       RE: Vazquez Rodriguez  [address]  [place] [address]     Dear Colleague,    Thank you for referring your patient, Vazquez Rodriguez, to the [facility] [place] [facility]. Please see a copy of my visit note below.      [facility]  New Patient Visit      Encounter date:   May 28, 2025    Referring Provider:   Ramírez Woods MD  [address]  [address]  [place] [address]    Chief Complaint: Consult     History of Present Illness:   Vazquez Rodriguez is a 40 year old male who presents for consultation regarding chronic sinusitis.    3/11/25-  OV Jeffrey Santiago MD @ [facility]: Patient was seen in office for sinusitis. Regarding this, He does note gradual improvement.  He was already treated with amoxicillin.  We will check labs as ordered.  Otherwise we are offering reassurance.  He is using Flonase right now.  I suggest doubling the Flonase for 3 days leading up to his long-haul flights.  Patient is in agreement.    4/7/25-  OV Ramírez Coleman MD @ [facility]: Patient was seen in office for routine health exam. At this time, chronic sinusitis was reported. Regarding this, Persistent sinusitis symptoms.  We discussed talking to an ENT and he agreed.    5/28/25: Today, the patient reports his symptoms started in late December/early January. He didn't address it, but felt it may have been Covid. He reported a red face, headache, and brain fog. When his symptoms were in their peak, he reported no drainage which is unusual for him and a sensation of fullness/being flushed. He went to [place], where his symptoms resolved. Once he returned home and back to work, his symptoms returned but not as pronounced. The facial redness returned as well, especially when he is stressed. He did a lot of sinus rinses back in the winter, as well as Flonase and  Allegra. He hasn't done sinus rinses since winter, because his symptoms have decreased. He notices that when he works out, his symptoms improve. He denies a history of allergies. He states that his symptoms aren't overly bothersome right now, he just notices they are there. He endorses congestion but it is not persistent.     Of note, he travels a lot with work also has noticed increased stress      Review of systems: A 14-point review of systems has been conducted and was negative for any notable symptoms, except as dictated in the history of present illness.     Medical History:  Past Medical History:   Diagnosis Date     NO ACTIVE PROBLEMS         Surgical History:   Past Surgical History:   Procedure Laterality Date     HC TOOTH EXTRACTION W/FORCEP  03/2004    wisdom teeth        Family History:  Family History   Problem Relation Age of Onset     Family History Negative Mother      Family History Negative Father      Skin Cancer Father      Family History Negative Paternal Grandfather      Skin Cancer Brother         Social History:   Social History     Socioeconomic History     Marital status: Single   Occupational History     Occupation: student     Employer: [employer]     Comment: civil engineering, [place]   Tobacco Use     Smoking status: Never     Passive exposure: Never     Smokeless tobacco: Never   Vaping Use     Vaping status: Never Used   Substance and Sexual Activity     Alcohol use: Yes     Comment: couple beers a month     Drug use: No     Sexual activity: Yes     Partners: Female   Other Topics Concern     Parent/sibling w/ CABG, MI or angioplasty before 65F 55M? No     Social Drivers of Health     Financial Resource Strain: Low Risk  (4/4/2025)    Financial Resource Strain      Within the past 12 months, have you or your family members you live with been unable to get utilities (heat, electricity) when it was really needed?: No   Food Insecurity: Low Risk  (4/4/2025)    Food Insecurity      Within  "the past 12 months, did you worry that your food would run out before you got money to buy more?: No      Within the past 12 months, did the food you bought just not last and you didn t have money to get more?: No   Transportation Needs: Low Risk  (4/4/2025)    Transportation Needs      Within the past 12 months, has lack of transportation kept you from medical appointments, getting your medicines, non-medical meetings or appointments, work, or from getting things that you need?: No   Physical Activity: Sufficiently Active (4/4/2025)    Exercise Vital Sign      Days of Exercise per Week: 6 days      Minutes of Exercise per Session: 40 min   Stress: Stress Concern Present (4/4/2025)    [place] [employer] - Occupational Stress Questionnaire      Feeling of Stress : To some extent   Social Connections: Unknown (4/4/2025)    Social Connection and Isolation Panel [NHANES]      Frequency of Social Gatherings with Friends and Family: Twice a week   Interpersonal Safety: Low Risk  (4/7/2025)    Interpersonal Safety      Do you feel physically and emotionally safe where you currently live?: Yes      Within the past 12 months, have you been hit, slapped, kicked or otherwise physically hurt by someone?: No      Within the past 12 months, have you been humiliated or emotionally abused in other ways by your partner or ex-partner?: No   Housing Stability: Low Risk  (4/4/2025)    Housing Stability      Do you have housing? : Yes      Are you worried about losing your housing?: No        Physical Exam:  Vital signs: BP (!) 147/96   Pulse 83   Ht 1.746 m (5' 8.75\")   Wt 85 kg (187 lb 8 oz)   SpO2 99%   BMI 27.89 kg/m     General Appearance: No acute distress, appropriate demeanor, conversant  Eyes: moist conjunctivae; EOMI; pupils symmetric; visual acuity grossly intact; no proptosis  Head: normocephalic; overall symmetric appearance without deformity  Face: overall symmetric without deformity  Ears: " Normal appearance of external ear; external meatus normal in appearance  Nose: No external deformity  Oral Cavity/oropharynx: Normal appearance of mucosa  Neck: no palpable lymphadenopathy; thyroid without palpable nodules  Lungs: symmetric chest rise; no wheezing  CV: Good distal perfusion; normal hear rate  Extremities: No deformity  Neurologic Exam: Cranial nerves II-XII are grossly intact; no focal deficit    Procedure Note  Procedure performed: Rigid nasal endoscopy  Indication: To evaluate for sinonasal pathology not visualized on routine anterior rhinoscopy  Anesthesia: 4% topical lidocaine with 0.05% oxymetazoline  Description of procedure: After obtaining consent for the procedure from the patient, the sinonasal cavity was sprayed with topical anesthetic. A rigid 30-degree nasal endoscope was used to first used to visualize the nasal cavity, the turbinates, and the nasopharynx on the left. A second pass was then made to visualize the middle meatus, the superior meatus and sphenoethmoid recess. Finally, the scope was turned 90-degrees and used to visualize the olfactory cleft and frontal outflow tract. A similar approach was used for the contralateral side. They tolerated the procedure well without difficulty.    [place] Endoscopic Scoring System  Endoscopic observation Right Left   Polyps in middle meatus (0 = absent, 1 = restricted to middle meatus, 2 = Beyond middle meatus) 0 0   Discharge (0 = absent, 1 = thin and clear, 2 = thick, purulent) 0 0   Edema (0 = absent, 1 = mild-moderate, 2 = moderate-severe) 0 0   Crusting (0 = absent, 1 = mild-moderate, 2 = moderate-severe) 0 0   Scarring (0= absent, 1 = mild-moderate, 2 = moderate-severe) 0 0   Total 0 0     Findings  RT/LT: Bilateral sinonasal passages patent without evidence of infection, polyps, or mass.      The patient tolerated the procedure well without complication.     Assessment/Medical Decision Making:  Vazquez Rodriguez is a 40 year old  male who presents for consultation regarding possible chronic sinusitis. His symptoms are improved from what they were in the winter. Only real symptom now is facial flushing which I don't think is related to the sinuses. We discussed that we could gett a sinus CT scan though in the setting of improving symptoms could certainly wait. His exam looks good with no evidence of infection or drainage. I don't note any concerning findings. I suspect his symptoms will continue to improve. After a discussion, the patient opted to wait on the CT scan and see if his symptoms continue to improve. He will reach out if they do not and can do one at that point.     Plan:  Patient will reach out to clinic in one month. Based on that message, a sinus CT scan will be ordered if symptoms are not improved.   Follow up with me as needed.     Scribe Disclosure:   By signing my name below, I, Nidia Adama, attest that this documentation has been prepared under the direction and in the presence of Murray Street MD.  - Electronically Signed: Nidia Galan 05/28/25     Electronically signed by:    Murray Street MD    [facility]  Rhinology  Endoscopic Skull Base Surgery  [facility]  Department of Otolaryngology - Head & Neck Surgery          Again, thank you for allowing me to participate in the care of your patient.      Sincerely,    Murray Street MD

## 2025-06-15 DIAGNOSIS — F41.9 ANXIETY: ICD-10-CM

## 2025-06-15 RX ORDER — HYDROXYZINE HYDROCHLORIDE 25 MG/1
25 TABLET, FILM COATED ORAL 3 TIMES DAILY PRN
Qty: 90 TABLET | Refills: 8 | Status: SHIPPED | OUTPATIENT
Start: 2025-06-15

## 2025-07-15 ENCOUNTER — MYC MEDICAL ADVICE (OUTPATIENT)
Dept: OTOLARYNGOLOGY | Facility: CLINIC | Age: 40
End: 2025-07-15
Payer: COMMERCIAL